# Patient Record
Sex: FEMALE | Race: ASIAN | NOT HISPANIC OR LATINO | Employment: OTHER | URBAN - METROPOLITAN AREA
[De-identification: names, ages, dates, MRNs, and addresses within clinical notes are randomized per-mention and may not be internally consistent; named-entity substitution may affect disease eponyms.]

---

## 2017-03-04 ENCOUNTER — APPOINTMENT (OUTPATIENT)
Dept: RADIOLOGY | Facility: MEDICAL CENTER | Age: 72
DRG: 480 | End: 2017-03-04
Attending: ORTHOPAEDIC SURGERY
Payer: COMMERCIAL

## 2017-03-04 ENCOUNTER — APPOINTMENT (OUTPATIENT)
Dept: RADIOLOGY | Facility: MEDICAL CENTER | Age: 72
DRG: 480 | End: 2017-03-04
Attending: EMERGENCY MEDICINE
Payer: COMMERCIAL

## 2017-03-04 ENCOUNTER — RESOLUTE PROFESSIONAL BILLING HOSPITAL PROF FEE (OUTPATIENT)
Dept: HOSPITALIST | Facility: MEDICAL CENTER | Age: 72
End: 2017-03-04

## 2017-03-04 ENCOUNTER — HOSPITAL ENCOUNTER (INPATIENT)
Facility: MEDICAL CENTER | Age: 72
LOS: 11 days | DRG: 480 | End: 2017-03-15
Attending: EMERGENCY MEDICINE | Admitting: INTERNAL MEDICINE
Payer: COMMERCIAL
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
## 2017-03-04 DIAGNOSIS — S72.001A CLOSED FRACTURE OF NECK OF RIGHT FEMUR, INITIAL ENCOUNTER (HCC): ICD-10-CM

## 2017-03-04 PROBLEM — E11.9 DM TYPE 2 (DIABETES MELLITUS, TYPE 2) (HCC): Status: ACTIVE | Noted: 2017-03-04

## 2017-03-04 PROBLEM — S72.009A FEMORAL NECK FRACTURE (HCC): Status: ACTIVE | Noted: 2017-03-04

## 2017-03-04 LAB
ALBUMIN SERPL BCP-MCNC: 4 G/DL (ref 3.2–4.9)
ALBUMIN/GLOB SERPL: 1.1 G/DL
ALP SERPL-CCNC: 41 U/L (ref 30–99)
ALT SERPL-CCNC: 17 U/L (ref 2–50)
ANION GAP SERPL CALC-SCNC: 11 MMOL/L (ref 0–11.9)
APTT PPP: 24.3 SEC (ref 24.7–36)
AST SERPL-CCNC: 33 U/L (ref 12–45)
BASOPHILS # BLD AUTO: 0.4 % (ref 0–1.8)
BASOPHILS # BLD: 0.04 K/UL (ref 0–0.12)
BILIRUB SERPL-MCNC: 0.4 MG/DL (ref 0.1–1.5)
BUN SERPL-MCNC: 34 MG/DL (ref 8–22)
CALCIUM SERPL-MCNC: 9.6 MG/DL (ref 8.5–10.5)
CHLORIDE SERPL-SCNC: 104 MMOL/L (ref 96–112)
CO2 SERPL-SCNC: 22 MMOL/L (ref 20–33)
CREAT SERPL-MCNC: 0.96 MG/DL (ref 0.5–1.4)
EOSINOPHIL # BLD AUTO: 0.19 K/UL (ref 0–0.51)
EOSINOPHIL NFR BLD: 2.1 % (ref 0–6.9)
ERYTHROCYTE [DISTWIDTH] IN BLOOD BY AUTOMATED COUNT: 36.1 FL (ref 35.9–50)
FERRITIN SERPL-MCNC: 194.2 NG/ML (ref 10–291)
GFR SERPL CREATININE-BSD FRML MDRD: 57 ML/MIN/1.73 M 2
GLOBULIN SER CALC-MCNC: 3.7 G/DL (ref 1.9–3.5)
GLUCOSE BLD-MCNC: 175 MG/DL (ref 65–99)
GLUCOSE SERPL-MCNC: 188 MG/DL (ref 65–99)
HCT VFR BLD AUTO: 32.9 % (ref 37–47)
HGB BLD-MCNC: 10.5 G/DL (ref 12–16)
HGB RETIC QN AUTO: 24.2 PG/CELL (ref 29–35)
IMM GRANULOCYTES # BLD AUTO: 0.04 K/UL (ref 0–0.11)
IMM GRANULOCYTES NFR BLD AUTO: 0.4 % (ref 0–0.9)
IMM RETICS NFR: 25.2 % (ref 9.3–17.4)
INR PPP: 0.93 (ref 0.87–1.13)
IRON SATN MFR SERPL: 7 % (ref 15–55)
IRON SERPL-MCNC: 26 UG/DL (ref 40–170)
LYMPHOCYTES # BLD AUTO: 1.19 K/UL (ref 1–4.8)
LYMPHOCYTES NFR BLD: 13.2 % (ref 22–41)
MCH RBC QN AUTO: 20.8 PG (ref 27–33)
MCHC RBC AUTO-ENTMCNC: 31.9 G/DL (ref 33.6–35)
MCV RBC AUTO: 65.3 FL (ref 81.4–97.8)
MONOCYTES # BLD AUTO: 0.62 K/UL (ref 0–0.85)
MONOCYTES NFR BLD AUTO: 6.9 % (ref 0–13.4)
NEUTROPHILS # BLD AUTO: 6.91 K/UL (ref 2–7.15)
NEUTROPHILS NFR BLD: 77 % (ref 44–72)
NRBC # BLD AUTO: 0 K/UL
NRBC BLD AUTO-RTO: 0 /100 WBC
PLATELET # BLD AUTO: 290 K/UL (ref 164–446)
PMV BLD AUTO: 8.2 FL (ref 9–12.9)
POTASSIUM SERPL-SCNC: 4.4 MMOL/L (ref 3.6–5.5)
PROT SERPL-MCNC: 7.7 G/DL (ref 6–8.2)
PROTHROMBIN TIME: 12.7 SEC (ref 12–14.6)
RBC # BLD AUTO: 5.04 M/UL (ref 4.2–5.4)
RETICS # AUTO: 0.08 M/UL (ref 0.04–0.06)
RETICS/RBC NFR: 1.6 % (ref 0.8–2.1)
SODIUM SERPL-SCNC: 137 MMOL/L (ref 135–145)
TIBC SERPL-MCNC: 374 UG/DL (ref 250–450)
TROPONIN I SERPL-MCNC: <0.01 NG/ML (ref 0–0.04)
WBC # BLD AUTO: 9 K/UL (ref 4.8–10.8)

## 2017-03-04 PROCEDURE — 700111 HCHG RX REV CODE 636 W/ 250 OVERRIDE (IP): Performed by: ORTHOPAEDIC SURGERY

## 2017-03-04 PROCEDURE — 500122 HCHG BOVIE, BLADE: Performed by: ORTHOPAEDIC SURGERY

## 2017-03-04 PROCEDURE — 500891 HCHG PACK, ORTHO MAJOR: Performed by: ORTHOPAEDIC SURGERY

## 2017-03-04 PROCEDURE — 99291 CRITICAL CARE FIRST HOUR: CPT

## 2017-03-04 PROCEDURE — 502000 HCHG MISC OR IMPLANTS RC 0278: Performed by: ORTHOPAEDIC SURGERY

## 2017-03-04 PROCEDURE — 83550 IRON BINDING TEST: CPT

## 2017-03-04 PROCEDURE — 160029 HCHG SURGERY MINUTES - 1ST 30 MINS LEVEL 4: Performed by: ORTHOPAEDIC SURGERY

## 2017-03-04 PROCEDURE — 110382 HCHG SHELL REV 271: Performed by: ORTHOPAEDIC SURGERY

## 2017-03-04 PROCEDURE — 71010 DX-CHEST-PORTABLE (1 VIEW): CPT

## 2017-03-04 PROCEDURE — 700102 HCHG RX REV CODE 250 W/ 637 OVERRIDE(OP): Performed by: INTERNAL MEDICINE

## 2017-03-04 PROCEDURE — 700102 HCHG RX REV CODE 250 W/ 637 OVERRIDE(OP): Performed by: ORTHOPAEDIC SURGERY

## 2017-03-04 PROCEDURE — 73502 X-RAY EXAM HIP UNI 2-3 VIEWS: CPT | Mod: RT

## 2017-03-04 PROCEDURE — 700111 HCHG RX REV CODE 636 W/ 250 OVERRIDE (IP): Performed by: EMERGENCY MEDICINE

## 2017-03-04 PROCEDURE — 160048 HCHG OR STATISTICAL LEVEL 1-5: Performed by: ORTHOPAEDIC SURGERY

## 2017-03-04 PROCEDURE — 110371 HCHG SHELL REV 272: Performed by: ORTHOPAEDIC SURGERY

## 2017-03-04 PROCEDURE — 501838 HCHG SUTURE GENERAL: Performed by: ORTHOPAEDIC SURGERY

## 2017-03-04 PROCEDURE — 85025 COMPLETE CBC W/AUTO DIFF WBC: CPT

## 2017-03-04 PROCEDURE — 96375 TX/PRO/DX INJ NEW DRUG ADDON: CPT

## 2017-03-04 PROCEDURE — 700105 HCHG RX REV CODE 258: Performed by: EMERGENCY MEDICINE

## 2017-03-04 PROCEDURE — 500424 HCHG DRESSING, AIRSTRIP: Performed by: ORTHOPAEDIC SURGERY

## 2017-03-04 PROCEDURE — 160036 HCHG PACU - EA ADDL 30 MINS PHASE I: Performed by: ORTHOPAEDIC SURGERY

## 2017-03-04 PROCEDURE — 99407 BEHAV CHNG SMOKING > 10 MIN: CPT | Performed by: INTERNAL MEDICINE

## 2017-03-04 PROCEDURE — 700105 HCHG RX REV CODE 258: Performed by: INTERNAL MEDICINE

## 2017-03-04 PROCEDURE — 84484 ASSAY OF TROPONIN QUANT: CPT

## 2017-03-04 PROCEDURE — 83540 ASSAY OF IRON: CPT

## 2017-03-04 PROCEDURE — 700111 HCHG RX REV CODE 636 W/ 250 OVERRIDE (IP)

## 2017-03-04 PROCEDURE — 96374 THER/PROPH/DIAG INJ IV PUSH: CPT

## 2017-03-04 PROCEDURE — A4606 OXYGEN PROBE USED W OXIMETER: HCPCS | Performed by: ORTHOPAEDIC SURGERY

## 2017-03-04 PROCEDURE — 80053 COMPREHEN METABOLIC PANEL: CPT

## 2017-03-04 PROCEDURE — 93005 ELECTROCARDIOGRAM TRACING: CPT | Performed by: EMERGENCY MEDICINE

## 2017-03-04 PROCEDURE — 160002 HCHG RECOVERY MINUTES (STAT): Performed by: ORTHOPAEDIC SURGERY

## 2017-03-04 PROCEDURE — A9270 NON-COVERED ITEM OR SERVICE: HCPCS | Performed by: ORTHOPAEDIC SURGERY

## 2017-03-04 PROCEDURE — 85046 RETICYTE/HGB CONCENTRATE: CPT

## 2017-03-04 PROCEDURE — 770006 HCHG ROOM/CARE - MED/SURG/GYN SEMI*

## 2017-03-04 PROCEDURE — 99223 1ST HOSP IP/OBS HIGH 75: CPT | Performed by: INTERNAL MEDICINE

## 2017-03-04 PROCEDURE — 0QS606Z REPOSITION RIGHT UPPER FEMUR WITH INTRAMEDULLARY INTERNAL FIXATION DEVICE, OPEN APPROACH: ICD-10-PCS | Performed by: ORTHOPAEDIC SURGERY

## 2017-03-04 PROCEDURE — 96361 HYDRATE IV INFUSION ADD-ON: CPT

## 2017-03-04 PROCEDURE — 82728 ASSAY OF FERRITIN: CPT

## 2017-03-04 PROCEDURE — 85610 PROTHROMBIN TIME: CPT

## 2017-03-04 PROCEDURE — 160035 HCHG PACU - 1ST 60 MINS PHASE I: Performed by: ORTHOPAEDIC SURGERY

## 2017-03-04 PROCEDURE — 73552 X-RAY EXAM OF FEMUR 2/>: CPT | Mod: RT

## 2017-03-04 PROCEDURE — 94760 N-INVAS EAR/PLS OXIMETRY 1: CPT

## 2017-03-04 PROCEDURE — 85730 THROMBOPLASTIN TIME PARTIAL: CPT

## 2017-03-04 PROCEDURE — 82962 GLUCOSE BLOOD TEST: CPT

## 2017-03-04 PROCEDURE — 502240 HCHG MISC OR SUPPLY RC 0272: Performed by: ORTHOPAEDIC SURGERY

## 2017-03-04 PROCEDURE — 160041 HCHG SURGERY MINUTES - EA ADDL 1 MIN LEVEL 4: Performed by: ORTHOPAEDIC SURGERY

## 2017-03-04 PROCEDURE — 160009 HCHG ANES TIME/MIN: Performed by: ORTHOPAEDIC SURGERY

## 2017-03-04 PROCEDURE — 36415 COLL VENOUS BLD VENIPUNCTURE: CPT

## 2017-03-04 DEVICE — IMPLANTABLE DEVICE: Type: IMPLANTABLE DEVICE | Status: FUNCTIONAL

## 2017-03-04 RX ORDER — OXYCODONE HYDROCHLORIDE 10 MG/1
10 TABLET ORAL EVERY 4 HOURS PRN
Status: DISCONTINUED | OUTPATIENT
Start: 2017-03-04 | End: 2017-03-15 | Stop reason: HOSPADM

## 2017-03-04 RX ORDER — AMOXICILLIN 250 MG
2 CAPSULE ORAL 2 TIMES DAILY
Status: DISCONTINUED | OUTPATIENT
Start: 2017-03-04 | End: 2017-03-15 | Stop reason: HOSPADM

## 2017-03-04 RX ORDER — HEPARIN SODIUM 5000 [USP'U]/ML
5000 INJECTION, SOLUTION INTRAVENOUS; SUBCUTANEOUS EVERY 8 HOURS
Status: DISCONTINUED | OUTPATIENT
Start: 2017-03-05 | End: 2017-03-15 | Stop reason: HOSPADM

## 2017-03-04 RX ORDER — BISACODYL 10 MG
10 SUPPOSITORY, RECTAL RECTAL
Status: DISCONTINUED | OUTPATIENT
Start: 2017-03-04 | End: 2017-03-15 | Stop reason: HOSPADM

## 2017-03-04 RX ORDER — SODIUM CHLORIDE 9 MG/ML
INJECTION, SOLUTION INTRAVENOUS CONTINUOUS
Status: DISPENSED | OUTPATIENT
Start: 2017-03-04 | End: 2017-03-05

## 2017-03-04 RX ORDER — IBUPROFEN 800 MG/1
800 TABLET ORAL EVERY 8 HOURS PRN
Status: ON HOLD | COMMUNITY
End: 2017-03-15

## 2017-03-04 RX ORDER — OXYCODONE HYDROCHLORIDE 5 MG/1
5 TABLET ORAL EVERY 4 HOURS PRN
Status: DISCONTINUED | OUTPATIENT
Start: 2017-03-04 | End: 2017-03-15 | Stop reason: HOSPADM

## 2017-03-04 RX ORDER — NICOTINE 21 MG/24HR
14 PATCH, TRANSDERMAL 24 HOURS TRANSDERMAL
Status: DISCONTINUED | OUTPATIENT
Start: 2017-03-04 | End: 2017-03-15 | Stop reason: HOSPADM

## 2017-03-04 RX ORDER — POLYETHYLENE GLYCOL 3350 17 G/17G
1 POWDER, FOR SOLUTION ORAL
Status: DISCONTINUED | OUTPATIENT
Start: 2017-03-04 | End: 2017-03-15 | Stop reason: HOSPADM

## 2017-03-04 RX ORDER — SODIUM CHLORIDE 9 MG/ML
1000 INJECTION, SOLUTION INTRAVENOUS ONCE
Status: COMPLETED | OUTPATIENT
Start: 2017-03-04 | End: 2017-03-04

## 2017-03-04 RX ORDER — ONDANSETRON 2 MG/ML
4 INJECTION INTRAMUSCULAR; INTRAVENOUS ONCE
Status: COMPLETED | OUTPATIENT
Start: 2017-03-04 | End: 2017-03-04

## 2017-03-04 RX ORDER — MORPHINE SULFATE 4 MG/ML
4 INJECTION, SOLUTION INTRAMUSCULAR; INTRAVENOUS ONCE
Status: COMPLETED | OUTPATIENT
Start: 2017-03-04 | End: 2017-03-04

## 2017-03-04 RX ADMIN — ONDANSETRON 4 MG: 2 INJECTION, SOLUTION INTRAMUSCULAR; INTRAVENOUS at 10:06

## 2017-03-04 RX ADMIN — CEFAZOLIN SODIUM 1000 MG: 1 INJECTION, SOLUTION INTRAVENOUS at 22:20

## 2017-03-04 RX ADMIN — MORPHINE SULFATE 4 MG: 4 INJECTION INTRAVENOUS at 10:06

## 2017-03-04 RX ADMIN — OXYCODONE HYDROCHLORIDE 5 MG: 5 TABLET ORAL at 19:58

## 2017-03-04 RX ADMIN — SODIUM CHLORIDE: 9 INJECTION, SOLUTION INTRAVENOUS at 22:24

## 2017-03-04 RX ADMIN — SODIUM CHLORIDE 1000 ML: 9 INJECTION, SOLUTION INTRAVENOUS at 10:06

## 2017-03-04 RX ADMIN — INSULIN LISPRO 2 UNITS: 100 INJECTION, SOLUTION INTRAVENOUS; SUBCUTANEOUS at 22:19

## 2017-03-04 ASSESSMENT — ENCOUNTER SYMPTOMS
LOSS OF CONSCIOUSNESS: 0
NAUSEA: 0
SHORTNESS OF BREATH: 0
DIZZINESS: 1
HEADACHES: 0
VOMITING: 0
NECK PAIN: 0

## 2017-03-04 ASSESSMENT — PAIN SCALES - GENERAL
PAINLEVEL_OUTOF10: 0
PAINLEVEL_OUTOF10: 10
PAINLEVEL_OUTOF10: 0
PAINLEVEL_OUTOF10: 10

## 2017-03-04 ASSESSMENT — LIFESTYLE VARIABLES
ALCOHOL_USE: NO
EVER_SMOKED: YES
EVER_SMOKED: YES

## 2017-03-04 ASSESSMENT — COPD QUESTIONNAIRES
HAVE YOU SMOKED AT LEAST 100 CIGARETTES IN YOUR ENTIRE LIFE: YES
DURING THE PAST 4 WEEKS HOW MUCH DID YOU FEEL SHORT OF BREATH: NONE/LITTLE OF THE TIME
DO YOU EVER COUGH UP ANY MUCUS OR PHLEGM?: YES, A FEW DAYS A WEEK OR MONTH
COPD SCREENING SCORE: 5

## 2017-03-04 NOTE — CONSULTS
DATE OF SERVICE:  03/04/2017    REQUESTING PHYSICIAN:  Dr. Ro Meredith, emergency department.    REASON FOR CONSULTATION:  Right intertrochanteric femur fracture.    CHIEF COMPLAINT:  Right hip pain.    HISTORY OF PRESENT ILLNESS:  The patient is a 71-year-old female.  She is here   with her daughter and son-in-law.  She is visiting from the [facility].  She   was reaching over to pick something up and says that she got dizzy and fell   on to her right hip.  Her family says that she occasionally gets dizzy when   she stands up too quickly, but has not had recurrent falls.  She landed onto   her right hip and had immediate pain and inability to bear weight.  She was   transported to [facility] in the emergency department where x-rays determined that   she had a right displaced intertrochanteric femur fracture.  She denies   having pain elsewhere other than that to her right thigh and groin area.  She   denies loss of consciousness or hitting her head.  She also denies numbness   and paresthesias in her extremities.    ALLERGIES:  No known drug allergies.    OUTPATIENT MEDICATIONS:  Include metformin, ibuprofen, and multivitamin.    PAST MEDICAL DIAGNOSIS:  Type 2 diabetes.    PAST SURGICAL HISTORY:  Surgery for an ectopic pregnancy years ago.    SOCIAL HISTORY:  The patient smokes half pack of cigarettes a day.  Does not   drink alcohol and does not use illicit drugs.  She is visiting from the   [facility] and staying with her family.    FAMILY HISTORY:  Negative for significant medical comorbidities that they are   aware of, other than diabetes.    REVIEW OF SYSTEMS:  She denies fevers, chills, nausea, vomiting, shortness of   breath or active chest pain, otherwise normal per AMA criteria other than that   already stated in the HPI.    PHYSICAL EXAMINATION:  VITAL SIGNS:  Temperature 98.8, heart rate 71, respiratory rate 16, blood   pressure 155/77, pulse oximetry 95% on room air.  GENERAL APPEARANCE:  The patient is  alert.  She is oriented.  She is in a mild   amount of distress, relatively comfortable lying supine on the gurney.  HEAD, EYES, EARS, NOSE, AND THROAT:  Normocephalic and atraumatic.  Mucous   membranes are moist.  Nonicteric sclerae.  PULMONARY:  Symmetric, unlabored breathing.  CARDIOVASCULAR:  Extremities well perfused.  No obvious elevated JVP is noted.  ABDOMEN:  Thin, nondistended.  MUSCULOSKELETAL:  Examination of the right lower extremity, her limb short and   externally rotated and her hip is flexed.  She is nontender to palpation at   the knee where there is no knee effusion.  She is able to dorsi and plantar   flex her foot and flex and extend her toes.  She has sensation diffusely   intact to light touch in the sural, saphenous, deep peroneal, superficial   peroneal, and tibial nerve distributions with palpable dorsalis pedis pulse.    The left lower extremity and bilateral upper extremities are without evidence   of traumatic deformity and grossly neurovascularly intact.    RADIOGRAPHIC DATA:  Plain x-rays of the pelvis including the right hip and   femur shows evidence of a displaced intertrochanteric femur fracture with some   comminution.    LABORATORY DATA:  White blood count is 9.0, hemoglobin 10.5, hematocrit 32.9,   platelet count 290,000.  Glucose is 188, creatinine 0.96, potassium 4.4,   sodium 137.  Her INR is 0.93.    ASSESSMENT:  A 71-year-old female with diabetes and a right displaced   intertrochanteric femur fracture from a ground level fall.  She is being   evaluated for admission to the hospitalist service.    RECOMMENDATIONS:  1.  I discussed these findings in detail with the family.  We discussed   treatment recommendations and I recommend surgical fixation with   intramedullary nailing.  We discussed the alternative being nonoperative   management, which is essentially bed rest.  I do not feel that she is a good   candidate for this given the risk for continued pain, malunion and  prolonged   bed rest potentially result in pneumonia and bed sores.  We discussed risks of   surgery including infection, nonunion, malunion, loss of fixation, iatrogenic   fracture, potential for risk of injury to neurovascular structures, potential   for blood loss requiring transfusion, DVT and pulmonary embolism and as well   as general risks of anesthesia including heart attack, stroke, and death.  Her   family expressed understanding and the patient wishes to proceed with   surgical management when possible.  2.  Pending medical optimization, I will try to make preparations to get the   patient to the operating room later this afternoon for surgical fixation of   right displaced intertrochanteric femur fracture.  3.  She should continue to be bed rest and n.p.o.       ____________________________________     MD MANUEL Bedoya / NTS    DD:  03/04/2017 11:50:18  DT:  03/04/2017 12:10:40    D#:  [id number]  Job#:  [id number]

## 2017-03-04 NOTE — IP AVS SNAPSHOT
" Home Care Instructions                                                                                                                  Name:Analia Martel  Medical Record Number:[id number]  CSN: [id number]    YOB: 1945   Age: 71 y.o.  Sex: female  HT:1.473 m (4' 10\") WT: 40.824 kg (90 lb)          Admit Date: 3/4/2017     Discharge Date:   Today's Date: 3/15/2017  Attending Doctor:  Christian Womack M.D.                  Allergies:  Review of patient's allergies indicates no known allergies.            Discharge Instructions       Discharge Instructions    Discharged to home by car with relative. Discharged via wheelchair, hospital escort: Yes.  Special equipment needed: Not Applicable and Walker    Be sure to schedule a follow-up appointment with your primary care doctor or any specialists as instructed.     Discharge Plan:   Diet Plan: Discussed  Activity Level: Discussed  Smoking Cessation Offered: Patient Refused  Confirmed Follow up Appointment: Appointment Scheduled  Confirmed Symptoms Management: Discussed  Medication Reconciliation Updated: Yes  Influenza Vaccine Indication: Not indicated: Previously immunized this influenza season and > 8 years of age  Influenza Vaccine Given - only chart on this line when given: Influenza Vaccine Given (See MAR)    I understand that a diet low in cholesterol, fat, and sodium is recommended for good health. Unless I have been given specific instructions below for another diet, I accept this instruction as my diet prescription.   Other diet: Return to Regular diet    Special Instructions:   Discharge patient Home   Diet low fat, low sugar, heart healthy with 9-13 servings of fruits and vegetables   Activities as tolerated   Follow ups with PCP in 7-10 days  Medications per medication sheet   No smoking, no alcohol, no caffeine   Wear seat belt in motorized vehicle   Take medications as perscribed   Keep appointments   If symptoms worsen call PCP, 911 or " urgent care.    · Is patient discharged on Warfarin / Coumadin?   No     · Is patient Post Blood Transfusion?  No    Depression / Suicide Risk    As you are discharged from this [facility] facility, it is important to learn how to keep safe from harming yourself.    Recognize the warning signs:  · Abrupt changes in personality, positive or negative- including increase in energy   · Giving away possessions  · Change in eating patterns- significant weight changes-  positive or negative  · Change in sleeping patterns- unable to sleep or sleeping all the time   · Unwillingness or inability to communicate  · Depression  · Unusual sadness, discouragement and loneliness  · Talk of wanting to die  · Neglect of personal appearance   · Rebelliousness- reckless behavior  · Withdrawal from people/activities they love  · Confusion- inability to concentrate     If you or a loved one observes any of these behaviors or has concerns about self-harm, here's what you can do:  · Talk about it- your feelings and reasons for harming yourself  · Remove any means that you might use to hurt yourself (examples: pills, rope, extension cords, firearm)  · Get professional help from the community (Mental Health, Substance Abuse, psychological counseling)  · Do not be alone:Call your Safe Contact- someone whom you trust who will be there for you.  · Call your local CRISIS HOTLINE 339-3502 or 059-342-2749  · Call your local Children's Mobile Crisis Response Team [place] (252) 383-9948 or www.Leapfactor  · Call the toll free National Suicide Prevention Hotlines   · National Suicide Prevention Lifeline 985-710-RXPO (1241)  · National Hope Line Network 800-SUICIDE (053-0941)        Follow-up Information     1. Follow up with Jean Carlos Ozuna M.D.. Go on 3/24/2017.    Specialty:  Orthopaedics    Why:  PLEASE ARRIVE AT 1:30PM FOR A 2:00PM APPOINTMENT.  **PLEASE BRING 250.00 DEPOSITE WITH YOU TO THIS VISIT-IF YOU NEED TO SET UP PAYMENT  ARRANGEMENTS CALL [phone] AND ASK FOR BILLING** THANK YOU    Contact information    [address] [place] [address]  [place] [id number] [phone]           Discharge Medication Instructions:    Below are the medications your physician expects you to take upon discharge:    Review all your home medications and newly ordered medications with your doctor and/or pharmacist. Follow medication instructions as directed by your doctor and/or pharmacist.    Please keep your medication list with you and share with your physician.               Medication List      START taking these medications        Instructions    ferrous gluconate 324 (38 FE) MG Tabs   Last time this was given:  324 mg on 3/15/2017  9:03 AM   Commonly known as:  FERGON    Take 1 Tab by mouth every morning with breakfast.   Dose:  324 mg       lisinopril 5 MG Tabs   Last time this was given:  5 mg on 3/15/2017  9:03 AM   Commonly known as:  PRINIVIL    Take 1 Tab by mouth every day.   Dose:  5 mg       oxybutynin 5 MG Tabs   Last time this was given:  5 mg on 3/15/2017  9:03 AM   Commonly known as:  DITROPAN    Take 1 Tab by mouth 2 Times a Day.   Dose:  5 mg       oxycodone immediate-release 5 MG Tabs   Last time this was given:  5 mg on 3/15/2017  2:32 PM   Commonly known as:  ROXICODONE    Take 1 Tab by mouth every four hours as needed.   Dose:  5 mg         CONTINUE taking these medications        Instructions    metformin 500 MG Tabs   Commonly known as:  GLUCOPHAGE    Take 1,000 mg by mouth 2 times a day, with meals.   Dose:  1000 mg       MULTI VITAMIN DAILY PO    Take 1 Cap by mouth every day. [employer]in Daily Diabetes Health Pack   Dose:  1 Cap         STOP taking these medications     ibuprofen 800 MG Tabs   Commonly known as:  MOTRIN               Instructions           Diet / Nutrition:    Follow any diet instructions given to you by your doctor or the dietician, including how much salt (sodium) you are allowed each day.    If you are overweight,  talk to your doctor about a weight reduction plan.    Activity:    Remain physically active following your doctor's instructions about exercise and activity.    Rest often.     Any time you become even a little tired or short of breath, SIT DOWN and rest.    Worsening Symptoms:    Report any of the following signs and symptoms to the doctor's office immediately:    *Pain of jaw, arm, or neck  *Chest pain not relieved by medication                               *Dizziness or loss of consciousness  *Difficulty breathing even when at rest   *More tired than usual                                       *Bleeding drainage or swelling of surgical site  *Swelling of feet, ankles, legs or stomach                 *Fever (>100ºF)  *Pink or blood tinged sputum  *Weight gain (3lbs/day or 5lbs /week)           *Shock from internal defibrillator (if applicable)  *Palpitations or irregular heartbeats                *Cool and/or numb extremities    Stroke Awareness    Common Risk Factors for Stroke include:    Age  Atrial Fibrillation  Carotid Artery Stenosis  Diabetes Mellitus  Excessive alcohol consumption  High blood pressure  Overweight   Physical inactivity  Smoking    Warning signs and symptoms of a stroke include:    *Sudden numbness or weakness of the face, arm or leg (especially on one side of the body).  *Sudden confusion, trouble speaking or understanding.  *Sudden trouble seeing in one or both eyes.  *Sudden trouble walking, dizziness, loss of balance or coordination.Sudden severe headache with no known cause.    It is very important to get treatment quickly when a stroke occurs. If you experience any of the above warning signs, call 911 immediately.                   Disclaimer         Quit Smoking / Tobacco Use:    I understand the use of any tobacco products increases my chance of suffering from future heart disease or stroke and could cause other illnesses which may shorten my life. Quitting the use of tobacco  products is the single most important thing I can do to improve my health. For further information on smoking / tobacco cessation call a Toll Free Quit Line at 1-282.857.8196 (*National Cancer [place]) or 1-531.103.4841 (American Lung Association) or you can access the web based program at www.lungusa.org.    [place] Tobacco Users Help Line:  (104) 865-3888       Toll Free: 1-275.275.5342  Quit Tobacco Program [facility] (135)429-8784    Crisis Hotline:    [place] Crisis Hotline:  6-090-GTNKZWR or 1-233.244.7586    [place] Crisis Hotline:    1-402.750.6780 or 514-790-2573    Discharge Survey:   Thank you for choosing [facility]. We hope we did everything we could to make your hospital stay a pleasant one. You may be receiving a phone survey and we would appreciate your time and participation in answering the questions. Your input is very valuable to us in our efforts to improve our service to our patients and their families.        My signature on this form indicates that:    1. I have reviewed and understand the above information.  2. My questions regarding this information have been answered to my satisfaction.  3. I have formulated a plan with my discharge nurse to obtain my prescribed medications for home.                  Disclaimer         __________________________________                     __________       ________                       Patient Signature                                                 Date                    Time

## 2017-03-04 NOTE — IP AVS SNAPSHOT
3/15/2017          Analia Martel  [address] [place] [address]    Dear Analia:    [facility] wants to ensure your discharge home is safe and you or your loved ones have had all your questions answered regarding your care after you leave the hospital.    You may receive a telephone call within two days of your discharge.  This call is to make certain you understand your discharge instructions as well as ensure we provided you with the best care possible during your stay with us.     The call will only last approximately 3-5 minutes and will be done by a nurse.    Once again, we want to ensure your discharge home is safe and that you have a clear understanding of any next steps in your care.  If you have any questions or concerns, please do not hesitate to contact us, we are here for you.  Thank you for choosing [facility] for your healthcare needs.    Sincerely,    Alo Krishna    [facility]

## 2017-03-04 NOTE — ED PROVIDER NOTES
Pt resting with eyes closed.  Family is concerned as she does not have health insurance.  PFS notified and they will send a financial person when she is able.

## 2017-03-04 NOTE — IP AVS SNAPSHOT
[employer]ol[employer] Access Code: [email or web address]  Expires: 4/10/2017 11:35 AM    Your email address is not on file at [facility].  Email Addresses are required for you to sign up for Scholastica, please contact [phone] to verify your personal information and to provide your email address prior to attempting to register for Scholastica.    Analia Martel  [address] [place] [address]    Scholastica  A secure, online tool to manage your health information     [employer]’s Scholastica® is a secure, online tool that connects you to your personalized health information from the privacy of your home -- day or night - making it very easy for you to manage your healthcare. Once the activation process is completed, you can even access your medical information using the Scholastica gracie, which is available for free in the Apple Gracie store or Google Play store.     To learn more about Scholastica, visit [email or web address]    There are two levels of access available (as shown below):   My Chart Features  [facility] Primary Care Doctor [facility]  Specialists [facility]  Urgent  Care Non-[facility] Primary Care Doctor   Email your healthcare team securely and privately 24/7 X X X    Manage appointments: schedule your next appointment; view details of past/upcoming appointments X      Request prescription refills. X      View recent personal medical records, including lab and immunizations X X X X   View health record, including health history, allergies, medications X X X X   Read reports about your outpatient visits, procedures, consult and ER notes X X X X   See your discharge summary, which is a recap of your hospital and/or ER visit that includes your diagnosis, lab results, and care plan X X  X     How to register for Scholastica:  Once your e-mail address has been verified, follow the following steps to sign up for [employer].     1. Go to  [email or web address]  2. Click on the Sign Up Now box, which takes you to the New Member Sign Up page. You  will need to provide the following information:  a. Enter your [employer] Access Code exactly as it appears at the top of this page. (You will not need to use this code after you’ve completed the sign-up process. If you do not sign up before the expiration date, you must request a new code.)   b. Enter your date of birth.   c. Enter your home email address.   d. Click Submit, and follow the next screen’s instructions.  3. Create a PhotoSolart ID. This will be your [employer] login ID and cannot be changed, so think of one that is secure and easy to remember.  4. Create a [employer] password. You can change your password at any time.  5. Enter your Password Reset Question and Answer. This can be used at a later time if you forget your password.   6. Enter your e-mail address. This allows you to receive e-mail notifications when new information is available in [employer].  7. Click Sign Up. You can now view your health information.    For assistance activating your [employer] account, call [phone]

## 2017-03-04 NOTE — IP AVS SNAPSHOT
" <p align=\"LEFT\"><IMG SRC=\"//[email or web address]\" alt=\"Image\" WIDTH=\"50%\" HEIGHT=\"200\" BORDER=\"\"></p>                   Name:Analia Martel  Medical Record Number:[id number]  CSN: [id number]    YOB: 1945   Age: 71 y.o.  Sex: female  HT:1.473 m (4' 10\") WT: 40.824 kg (90 lb)          Admit Date: 3/4/2017     Discharge Date:   Today's Date: 3/15/2017  Attending Doctor:  Christian Womack M.D.                  Allergies:  Review of patient's allergies indicates no known allergies.          Follow-up Information     1. Follow up with Jean Carlos Ozuna M.D.. Go on 3/24/2017.    Specialty:  Orthopaedics    Why:  PLEASE ARRIVE AT 1:30PM FOR A 2:00PM APPOINTMENT.  **PLEASE BRING 250.00 DEPOSITE WITH YOU TO THIS VISIT-IF YOU NEED TO SET UP PAYMENT ARRANGEMENTS CALL [phone] AND ASK FOR BILLING** THANK YOU    Contact information    [address] [place] [id number] [phone]           Medication List      Take these Medications        Instructions    ferrous gluconate 324 (38 FE) MG Tabs   Commonly known as:  FERGON    Take 1 Tab by mouth every morning with breakfast.   Dose:  324 mg       lisinopril 5 MG Tabs   Commonly known as:  PRINIVIL    Take 1 Tab by mouth every day.   Dose:  5 mg       metformin 500 MG Tabs   Commonly known as:  GLUCOPHAGE    Take 1,000 mg by mouth 2 times a day, with meals.   Dose:  1000 mg       MULTI VITAMIN DAILY PO    Take 1 Cap by mouth every day. [employer] Daily Diabetes Health Pack   Dose:  1 Cap       oxybutynin 5 MG Tabs   Commonly known as:  DITROPAN    Take 1 Tab by mouth 2 Times a Day.   Dose:  5 mg       oxycodone immediate-release 5 MG Tabs   Commonly known as:  ROXICODONE    Take 1 Tab by mouth every four hours as needed.   Dose:  5 mg         "

## 2017-03-04 NOTE — ED NOTES
Chief Complaint   Patient presents with   • T-5000 GLF     PT BIB self/family for c/o right side hip pain after a GLF/near syncopal episode yesterday after standing from squatting position. Pt reports right side hip/groin pain.   • Near Syncopal   • Hip Pain     Explained to pt triage process, made pt aware to tell this RN of any changes/concerns, pt verbalized understanding of process and instructions given. Pt to ER lobby.

## 2017-03-04 NOTE — ED PROVIDER NOTES
ED Provider Note    Scribed for Ro Meredith D.O. by Coretta Mcdonald. 3/4/2017, 9:45 AM.    Primary care provider: No primary care provider on file.  Means of arrival: wheel chair   History obtained from: patient   History limited by: none     CHIEF COMPLAINT  Chief Complaint   Patient presents with   • T-5000 GLF     PT BIB self/family for c/o right side hip pain after a GLF/near syncopal episode yesterday after standing from squatting position. Pt reports right side hip/groin pain.   • Near Syncopal   • Hip Pain       HPI  Analia Martel is a 71 y.o. female who presents to the Emergency Department status post ground level fall that occurred yesterday reporting subsequent onset right hip pain. The patient was bending over when she stood quickly, felt light headed, and then fell to the ground and hit her right hip. She did not lose consciousness or have head trauma. The patient's son-in-law, carried her upstairs yesterday evening to bed.  The patient rested last night and was given Motrin, but she has been unable to ambulate so they brought the patient here. Patient is unable to straighten her leg without significant pain.  She denies chest pain, nausea, vomiting, neck pain.     REVIEW OF SYSTEMS  Review of Systems   Respiratory: Negative for shortness of breath.    Cardiovascular: Negative for chest pain.   Gastrointestinal: Negative for nausea and vomiting.   Musculoskeletal: Negative for neck pain.        Positive right hip pain   Neurological: Positive for dizziness (light headedness). Negative for loss of consciousness and headaches.     As above, all other systems negative.  C    PAST MEDICAL HISTORY   has a past medical history of Diabetes (CMS-HCC).    SURGICAL HISTORY   has past surgical history that includes gyn surgery.    SOCIAL HISTORY  Social History   Substance Use Topics   • Smoking status: Current Every Day Smoker -- 0.50 packs/day   • Smokeless tobacco: None   • Alcohol Use: No     "  History   Drug Use No   accompanied by daughter and son in law   Does not reside in [place]    FAMILY HISTORY  History reviewed. No pertinent family history.    CURRENT MEDICATIONS  Home Medications     Reviewed by Patrica Vivas R.N. (Registered Nurse) on 03/04/17 at 0933  Med List Status: Complete    Medication Last Dose Status    ibuprofen (MOTRIN) 800 MG Tab 3/4/2017 Active    metformin (GLUCOPHAGE) 500 MG Tab 3/4/2017 Active    multivitamin (THERAGRAN) Tab 3/4/2017 Active                ALLERGIES  No Known Allergies    PHYSICAL EXAM  VITAL SIGNS: /66 mmHg  Pulse 66  Temp(Src) 37.1 °C (98.8 °F)  Resp 16  Ht 1.473 m (4' 10\")  Wt 40.824 kg (90 lb)  BMI 18.82 kg/m2  SpO2 99%    Vitals reviewed.  Constitutional: Patient is oriented to person, place, and time. Appears well-developed and well-nourished. Moderate distress.    Head: Normocephalic and atraumatic.   Ears: Normal external ears bilaterally.   Mouth/Throat: Oropharynx is clear and moist  Eyes: Conjunctivae are normal. Pupils are equal, round, and reactive to light.   Neck: Normal range of motion. Neck supple. No C spine tenderness.   Cardiovascular: Normal rate, regular rhythm and normal heart sounds. Normal peripheral pulses.  Pulmonary/Chest: Effort normal and breath sounds normal. No respiratory distress, no wheezes, rhonchi, or rales. No chest wall tenderness.  Abdominal: Soft. Bowel sounds are normal. There is no tenderness, rebound or guarding, or peritoneal signs  Musculoskeletal: No edema. Sits with leg externally rotated and partially flexed at the knee for comfort. Diffuse right hip tenderness, no open wounds   Neurological: No focal deficits.   Skin: Skin is warm and dry. No erythema. No pallor.   Psychiatric: Patient has a normal mood and affect.       RADIOLOGY  DX-FEMUR-2+ RIGHT   Final Result      Right femoral neck fracture.      DX-HIP-COMPLETE - UNILATERAL 2+ RIGHT   Final Result      Right intertrochanteric femoral neck " fracture.      DX-CHEST-PORTABLE (1 VIEW)   Final Result      No acute cardiac or pulmonary abnormalities are identified.      ECHOCARDIOGRAM-COMP W/ CONT    (Results Pending)   CAROTID DUPLEX    (Results Pending)     The radiologist's interpretation of all radiological studies have been reviewed by me.    EKG Interpretation    Interpreted by me    Rhythm: normal sinus   Rate: normal at 69  Axis: normal  Ectopy: none  Conduction: normal  ST Segments: no acute change  T Waves: inversion in ANT-LAT leads   Q Waves: none    Clinical Impression: T wave inversions otherwise normal EKG. No old EKG for comparison    Labs:   Results for orders placed or performed during the hospital encounter of 03/04/17   CBC WITH DIFFERENTIAL   Result Value Ref Range    WBC 9.0 4.8 - 10.8 K/uL    RBC 5.04 4.20 - 5.40 M/uL    Hemoglobin 10.5 (L) 12.0 - 16.0 g/dL    Hematocrit 32.9 (L) 37.0 - 47.0 %    MCV 65.3 (L) 81.4 - 97.8 fL    MCH 20.8 (L) 27.0 - 33.0 pg    MCHC 31.9 (L) 33.6 - 35.0 g/dL    RDW 36.1 35.9 - 50.0 fL    Platelet Count 290 164 - 446 K/uL    MPV 8.2 (L) 9.0 - 12.9 fL    Neutrophils-Polys 77.00 (H) 44.00 - 72.00 %    Lymphocytes 13.20 (L) 22.00 - 41.00 %    Monocytes 6.90 0.00 - 13.40 %    Eosinophils 2.10 0.00 - 6.90 %    Basophils 0.40 0.00 - 1.80 %    Immature Granulocytes 0.40 0.00 - 0.90 %    Nucleated RBC 0.00 /100 WBC    Neutrophils (Absolute) 6.91 2.00 - 7.15 K/uL    Lymphs (Absolute) 1.19 1.00 - 4.80 K/uL    Monos (Absolute) 0.62 0.00 - 0.85 K/uL    Eos (Absolute) 0.19 0.00 - 0.51 K/uL    Baso (Absolute) 0.04 0.00 - 0.12 K/uL    Immature Granulocytes (abs) 0.04 0.00 - 0.11 K/uL    NRBC (Absolute) 0.00 K/uL   COMP METABOLIC PANEL   Result Value Ref Range    Sodium 137 135 - 145 mmol/L    Potassium 4.4 3.6 - 5.5 mmol/L    Chloride 104 96 - 112 mmol/L    Co2 22 20 - 33 mmol/L    Anion Gap 11.0 0.0 - 11.9    Glucose 188 (H) 65 - 99 mg/dL    Bun 34 (H) 8 - 22 mg/dL    Creatinine 0.96 0.50 - 1.40 mg/dL    Calcium 9.6 8.5  - 10.5 mg/dL    AST(SGOT) 33 12 - 45 U/L    ALT(SGPT) 17 2 - 50 U/L    Alkaline Phosphatase 41 30 - 99 U/L    Total Bilirubin 0.4 0.1 - 1.5 mg/dL    Albumin 4.0 3.2 - 4.9 g/dL    Total Protein 7.7 6.0 - 8.2 g/dL    Globulin 3.7 (H) 1.9 - 3.5 g/dL    A-G Ratio 1.1 g/dL   PRTOTHROMBIN TIME (INR)   Result Value Ref Range    PT 12.7 12.0 - 14.6 sec    INR 0.93 0.87 - 1.13   APTT   Result Value Ref Range    APTT 24.3 (L) 24.7 - 36.0 sec   ESTIMATED GFR   Result Value Ref Range    GFR If African American >60 >60 mL/min/1.73 m 2    GFR If Non  57 (A) >60 mL/min/1.73 m 2   EKG (ER)   Result Value Ref Range    Report       [facility] Emergency Dept.    Test Date:  2017  Pt Name:    GEOFF HAYNES              Department: ER  MRN:        [id number]                      Room:        19  Gender:     F                            Technician: [id number]  :        [id number]                   Requested By:DESTINEE BARROSO  Order #:    [id number]                    Reading MD:    Measurements  Intervals                                Axis  Rate:       69                           P:          70  NV:         168                          QRS:        53  QRSD:       70                           T:          156  QT:         456  QTc:        489    Interpretive Statements  SINUS RHYTHM  ABNORMAL T, PROBABLE ISCHEMIA, ANT-LAT LEADS  BORDERLINE PROLONGED QT INTERVAL  No previous ECG available for comparison        All labs reviewed by me.    COURSE & MEDICAL DECISION MAKING  Pertinent Labs & Imaging studies reviewed. (See chart for details)    Obtained and reviewed past medical records which show no previous visits    9:46AM - Patient seen and examined at bedside.  This does not appear to be an episode of syncope. Patient did not lose consciousness. He states when she tried to stand up quickly, she got dizzy, then came back down and tipped over to the ground. He is been unable to ambulate. She rests with  her hip externally rotated position. High suspicion for pelvic or hip fracture.   Patient will be treated with IV fluids, 4mg Morphine, 4mg Zofran. Ordered EKG, femur xray, hip xray, chest xray, CBC with differential, CMP, and other labs to evaluate her symptoms. The differential diagnoses include but are not limited to: hip fracture vs dislocation     I told the patient and her family member that we will obtain an xray to evaluate. The patient verbalizes understanding and agreement at this time to my plan of care    11:00 AM review of patient's xray shows the patient has a hip fracture, she will be admitted today.     11:02 AM I reevaluated patient at bedside and informed her and her family members of her xray results as above. Patient will need to be admitted today to have the fracture surgically repaired with orthopedic consult. The patient's son expresses concern as the patient does not have insurance at this time and is visiting from the [facility]. I advised that the patient be admitted today given her condition.     11:08 AM Paged orthopedics and hospitalist.      Discussed with Dr. Womack, hospitalist, who agrees to admit the patient to their service.      DISPOSITION:  Patient will be admitted to stable but  guarded condition.     FINAL IMPRESSION  1. Closed fracture of neck of right femur, initial encounter (CMS-[facility])    2. Fall, initial encounter          ICoretta (Vera), am scribing for, and in the presence of, Ro Meredith D.O..    Electronically signed by: Coretta Emerson), 3/4/2017    I, Ro Meredith D.O. personally performed the services described in this documentation, as scribed by Coretta Mcdonald in my presence, and it is both accurate and complete.    The note accurately reflects work and decisions made by me.  Ro Meredith  3/4/2017  12:06 PM

## 2017-03-04 NOTE — ED NOTES
Med rec complete per patient's family member.  Allergies reviewed - NKDA.  No antibiotics in last 30 days.

## 2017-03-05 LAB
ANION GAP SERPL CALC-SCNC: 4 MMOL/L (ref 0–11.9)
BUN SERPL-MCNC: 20 MG/DL (ref 8–22)
CALCIUM SERPL-MCNC: 8.2 MG/DL (ref 8.5–10.5)
CHLORIDE SERPL-SCNC: 112 MMOL/L (ref 96–112)
CO2 SERPL-SCNC: 24 MMOL/L (ref 20–33)
CREAT SERPL-MCNC: 0.91 MG/DL (ref 0.5–1.4)
ERYTHROCYTE [DISTWIDTH] IN BLOOD BY AUTOMATED COUNT: 37.7 FL (ref 35.9–50)
GFR SERPL CREATININE-BSD FRML MDRD: >60 ML/MIN/1.73 M 2
GLUCOSE BLD-MCNC: 135 MG/DL (ref 65–99)
GLUCOSE BLD-MCNC: 161 MG/DL (ref 65–99)
GLUCOSE BLD-MCNC: 176 MG/DL (ref 65–99)
GLUCOSE BLD-MCNC: 208 MG/DL (ref 65–99)
GLUCOSE SERPL-MCNC: 144 MG/DL (ref 65–99)
HCT VFR BLD AUTO: 27 % (ref 37–47)
HGB BLD-MCNC: 8.2 G/DL (ref 12–16)
LV EJECT FRACT  99904: 75
LV EJECT FRACT MOD 2C 99903: 73.34
LV EJECT FRACT MOD 4C 99902: 72.29
LV EJECT FRACT MOD BP 99901: 74.08
MAGNESIUM SERPL-MCNC: 2.1 MG/DL (ref 1.5–2.5)
MCH RBC QN AUTO: 20.2 PG (ref 27–33)
MCHC RBC AUTO-ENTMCNC: 29.9 G/DL (ref 33.6–35)
MCV RBC AUTO: 67.6 FL (ref 81.4–97.8)
PLATELET # BLD AUTO: 235 K/UL (ref 164–446)
PMV BLD AUTO: 9.5 FL (ref 9–12.9)
POTASSIUM SERPL-SCNC: 4.4 MMOL/L (ref 3.6–5.5)
RBC # BLD AUTO: 4.01 M/UL (ref 4.2–5.4)
SODIUM SERPL-SCNC: 140 MMOL/L (ref 135–145)
WBC # BLD AUTO: 7.3 K/UL (ref 4.8–10.8)

## 2017-03-05 PROCEDURE — 93306 TTE W/DOPPLER COMPLETE: CPT

## 2017-03-05 PROCEDURE — 700105 HCHG RX REV CODE 258

## 2017-03-05 PROCEDURE — 700102 HCHG RX REV CODE 250 W/ 637 OVERRIDE(OP): Performed by: INTERNAL MEDICINE

## 2017-03-05 PROCEDURE — 99233 SBSQ HOSP IP/OBS HIGH 50: CPT | Performed by: HOSPITALIST

## 2017-03-05 PROCEDURE — 93880 EXTRACRANIAL BILAT STUDY: CPT

## 2017-03-05 PROCEDURE — 82962 GLUCOSE BLOOD TEST: CPT | Mod: 91

## 2017-03-05 PROCEDURE — 85027 COMPLETE CBC AUTOMATED: CPT

## 2017-03-05 PROCEDURE — 770006 HCHG ROOM/CARE - MED/SURG/GYN SEMI*

## 2017-03-05 PROCEDURE — 97161 PT EVAL LOW COMPLEX 20 MIN: CPT

## 2017-03-05 PROCEDURE — 93306 TTE W/DOPPLER COMPLETE: CPT | Mod: 26 | Performed by: INTERNAL MEDICINE

## 2017-03-05 PROCEDURE — A9270 NON-COVERED ITEM OR SERVICE: HCPCS | Performed by: INTERNAL MEDICINE

## 2017-03-05 PROCEDURE — 700102 HCHG RX REV CODE 250 W/ 637 OVERRIDE(OP): Performed by: ORTHOPAEDIC SURGERY

## 2017-03-05 PROCEDURE — 700111 HCHG RX REV CODE 636 W/ 250 OVERRIDE (IP): Performed by: ORTHOPAEDIC SURGERY

## 2017-03-05 PROCEDURE — A9270 NON-COVERED ITEM OR SERVICE: HCPCS | Performed by: ORTHOPAEDIC SURGERY

## 2017-03-05 PROCEDURE — 700111 HCHG RX REV CODE 636 W/ 250 OVERRIDE (IP): Performed by: HOSPITALIST

## 2017-03-05 PROCEDURE — G8979 MOBILITY GOAL STATUS: HCPCS | Mod: CJ

## 2017-03-05 PROCEDURE — 80048 BASIC METABOLIC PNL TOTAL CA: CPT

## 2017-03-05 PROCEDURE — 93880 EXTRACRANIAL BILAT STUDY: CPT | Mod: 26 | Performed by: SURGERY

## 2017-03-05 PROCEDURE — G8978 MOBILITY CURRENT STATUS: HCPCS | Mod: CL

## 2017-03-05 PROCEDURE — 83735 ASSAY OF MAGNESIUM: CPT

## 2017-03-05 PROCEDURE — 36415 COLL VENOUS BLD VENIPUNCTURE: CPT

## 2017-03-05 RX ORDER — ONDANSETRON 4 MG/1
4 TABLET, ORALLY DISINTEGRATING ORAL EVERY 4 HOURS PRN
Status: DISCONTINUED | OUTPATIENT
Start: 2017-03-05 | End: 2017-03-15 | Stop reason: HOSPADM

## 2017-03-05 RX ORDER — SODIUM CHLORIDE 9 MG/ML
INJECTION, SOLUTION INTRAVENOUS
Status: COMPLETED
Start: 2017-03-05 | End: 2017-03-06

## 2017-03-05 RX ORDER — SODIUM CHLORIDE 9 MG/ML
INJECTION, SOLUTION INTRAVENOUS
Status: COMPLETED
Start: 2017-03-05 | End: 2017-03-05

## 2017-03-05 RX ADMIN — HEPARIN SODIUM 5000 UNITS: 5000 INJECTION, SOLUTION INTRAVENOUS; SUBCUTANEOUS at 05:46

## 2017-03-05 RX ADMIN — OXYCODONE HYDROCHLORIDE 5 MG: 5 TABLET ORAL at 05:45

## 2017-03-05 RX ADMIN — CEFAZOLIN SODIUM 1000 MG: 1 INJECTION, SOLUTION INTRAVENOUS at 05:45

## 2017-03-05 RX ADMIN — INSULIN LISPRO 1 UNITS: 100 INJECTION, SOLUTION INTRAVENOUS; SUBCUTANEOUS at 20:11

## 2017-03-05 RX ADMIN — SODIUM CHLORIDE: 9 INJECTION, SOLUTION INTRAVENOUS at 10:45

## 2017-03-05 RX ADMIN — HEPARIN SODIUM 5000 UNITS: 5000 INJECTION, SOLUTION INTRAVENOUS; SUBCUTANEOUS at 22:35

## 2017-03-05 RX ADMIN — OXYCODONE HYDROCHLORIDE 5 MG: 5 TABLET ORAL at 00:38

## 2017-03-05 RX ADMIN — HEPARIN SODIUM 5000 UNITS: 5000 INJECTION, SOLUTION INTRAVENOUS; SUBCUTANEOUS at 13:09

## 2017-03-05 RX ADMIN — ONDANSETRON 4 MG: 4 TABLET, ORALLY DISINTEGRATING ORAL at 17:08

## 2017-03-05 RX ADMIN — OXYCODONE HYDROCHLORIDE 5 MG: 5 TABLET ORAL at 17:21

## 2017-03-05 RX ADMIN — INSULIN LISPRO 1 UNITS: 100 INJECTION, SOLUTION INTRAVENOUS; SUBCUTANEOUS at 13:09

## 2017-03-05 RX ADMIN — Medication 2 TABLET: at 20:11

## 2017-03-05 RX ADMIN — INSULIN LISPRO 2 UNITS: 100 INJECTION, SOLUTION INTRAVENOUS; SUBCUTANEOUS at 17:16

## 2017-03-05 RX ADMIN — NICOTINE 14 MG: 14 PATCH TRANSDERMAL at 05:45

## 2017-03-05 RX ADMIN — Medication 2 TABLET: at 09:00

## 2017-03-05 RX ADMIN — OXYCODONE HYDROCHLORIDE 10 MG: 10 TABLET ORAL at 22:39

## 2017-03-05 ASSESSMENT — GAIT ASSESSMENTS: GAIT LEVEL OF ASSIST: UNABLE TO PARTICIPATE

## 2017-03-05 ASSESSMENT — ENCOUNTER SYMPTOMS
CHILLS: 0
NAUSEA: 0
SORE THROAT: 0
HEADACHES: 0
FOCAL WEAKNESS: 0
COUGH: 0
WHEEZING: 0
DEPRESSION: 0
DIZZINESS: 0
PHOTOPHOBIA: 0
FEVER: 0
TINGLING: 0
DIARRHEA: 0
MYALGIAS: 0
ABDOMINAL PAIN: 0
PALPITATIONS: 0
SHORTNESS OF BREATH: 0
VOMITING: 0

## 2017-03-05 ASSESSMENT — PAIN SCALES - GENERAL
PAINLEVEL_OUTOF10: 5

## 2017-03-05 NOTE — PROGRESS NOTES
71yoF with right IT femur fx s/p IMN 3/4.    S: having pain postop still be apparently not asking for pain medication, likely language barrier.  Son-in-law at bedside now this am as well as Dr. Andrea.     O:  Filed Vitals:    03/04/17 2040 03/05/17 0000 03/05/17 0400 03/05/17 0800   BP: 123/64 135/76 114/57 145/63   Pulse: 69 68 71 72   Temp: 36.6 °C (97.8 °F) 37.1 °C (98.8 °F) 37.6 °C (99.6 °F) 37.1 °C (98.7 °F)   Resp: 15 15 16 18   Height:       Weight:       SpO2: 100% 99% 92% 99%     Exam:  General-mild distress due to pain, following commands, alert  RLE-dressing c/d/i, +EHL/FHL/TA/GS motor, SILT in foot and palp dp pulse    Hct: 27    A: 71yoF with right IT femur fx s/p IMN 3/4.  Expected postop pain.    PLAN:  --WBAT RLE  --PT/OT for mobilization ASAP  --heparin and continue SCDs  --pain control  --fu am Hct, consider transfusion if develops symptomatic anemia

## 2017-03-05 NOTE — OR SURGEON
Immediate Post-Operative Note      PreOp Diagnosis: Right displaced IT femur fx    PostOp Diagnosis: same    Procedure(s):  HIP NAILING INTRAMEDULLARY - Wound Class: Clean    Surgeon(s):  Jean Carlos Ozuna M.D.    Anesthesiologist/Type of Anesthesia:  Anesthesiologist: Art Goss M.D./General    Surgical Staff:  Circulator: Fuentes Chou R.N.  Scrub Person: Juan Francisco Soriano  Radiology Technician: Alfonso Cuellar    Specimen: none    Estimated Blood Loss: 150cc    Findings: see dictation    Complications: none known    PLAN:  --readmit medicine postop  --ancef x 2 doses postop  --WBAT RLE  --PT/OT for mobilization ASAP  --start heparin tomorrow and continue SCDs        3/4/2017 4:31 PM Jean Carlos Ozuna

## 2017-03-05 NOTE — PROGRESS NOTES
Pt arrived to floor from PACU w/out event. Pt is sleeping calmly, awakens on command. Family at bedside. RN transfer of care to NOS RN - report given. (1845). Post op VS started

## 2017-03-05 NOTE — OR NURSING
Report called to Ro VITAL. Plan of care discussed, daughter remains at bedside, updated on plan. Patient continues to deny pain. Placed on transport.

## 2017-03-05 NOTE — OR NURSING
"Patient off unit with transport, daughter at bedside. Patient continues to rest with eyes closed, even and unlabored respirations noted. No acute s/s of distress, patient's daughter states \"mom finally looks comfortable, she hasn't been able to rest since she fell.\"   "

## 2017-03-05 NOTE — PROGRESS NOTES
71yoF with right IT femur fx s/p IMN today.    S: having pain postop but just used bedpan.  Family at bedside.      O:  Filed Vitals:    03/04/17 1730 03/04/17 1745 03/04/17 1800 03/04/17 1839   BP:    132/66   Pulse: 74 73 72 66   Temp:    36.8 °C (98.3 °F)   Resp: 11 20 15 17   Height:       Weight:       SpO2: 100% 100% 100% 100%     Exam:  General-mild distress due to pain, following commands, alert  RLE-dressing c/d/i, +EHL/FHL/TA/GS motor, SILT in foot and palp dp pulse    A: 71yoF with right IT femur fx s/p IMN 3/4.  Expected postop pain.    PLAN:  --ancef x 2 doses postop  --WBAT RLE  --PT/OT for mobilization ASAP  --start heparin tomorrow and continue SCDs  --pain control  --fu am Hct

## 2017-03-05 NOTE — OP REPORT
DATE OF SERVICE:  03/04/2017    PREOPERATIVE DIAGNOSIS:  Right intertrochanteric femur fracture, displaced.    POSTOPERATIVE DIAGNOSIS:  Right intertrochanteric femur fracture, displaced.    PROCEDURE PERFORMED:  Open treatment with intramedullary nailing, right   displaced intertrochanteric femur fracture.    SURGEON:  Jean Carlos Ozuna MD    ANESTHESIOLOGIST:  Art Goss MD    ANESTHESIA:  General and regional.    ESTIMATED BLOOD LOSS:  150 mL.    IMPLANTS:  Synthes 10 mm x 170 mm, 125 degree TFNA with a 75 mm helical blade   and a 28 mm distal interlocking screw.    INDICATION FOR PROCEDURE:  Patient is a 71-year-old female.  She sustained a   ground level fall and a right displaced intertrochanteric femur fracture.  She   was admitted to the medical service and felt to be medically optimized for   surgical management.  I discussed with her and her family risks, benefits, and   alternatives to surgical management.  She elected to proceed with surgery.    DESCRIPTION OF PROCEDURE:  Patient was met in the preoperative holding area   and her surgical site was signed and consent was confirmed to be accurate.    She was taken back to the operating room and general anesthesia was induced.    Ancef was administered.  She was positioned in the fracture table.  Traction,   internal rotation, and adduction were applied to the right lower extremity.    The left lower extremity was extended and suspended in traction boot.    Fluoroscopic imaging confirmed acceptable alignment of the fracture with   indirect reduction techniques.  The right thigh was then prepped and draped in   the usual sterile fashion.  A formal time-out was performed to confirm   patient's correct name, correct surgical site, correct procedure, and correct   laterality.  A percutaneous starting point was made with guide pin in the tip   of the greater trochanter was advanced into the proximal femur and confirmed   acceptable position on AP and  lateral fluoroscopic imaging.  I then made an   incision over the guide pin and reamed with a femoral entry reamer.  Given   that she had a relatively small bone and short stature, I did insert a   ball-tipped guide tam and reamed past the isthmus up to an 11 mm reamer as the   smallest nail with 10 mm that was available.  I inserted a 170 mm x 10 mm x   125 degree TFNA as 130-degree 10 mm nail was not available into the femur and   to the appropriate level.  Given the size of her bone, the nail was relatively   prominent, so I advanced it little more to reduce prominence of the greater   trochanter, putting the guide pin for the helical blade relatively low in the   femoral head on AP view, but it was centered on the lateral view.  I felt this   was acceptable.  I then prepared for an inserted a 75 mm helical blade, which   was the shortest.  She actually measured at 70 mm, so I am using the guide.    We planned to have the helical blade sit somewhat laterally to prevent   intra-articular penetration.  I then inserted the helical blade and then set   the set screw for dynamic compression and achieved compression through the jig  at the fracture site and then placed one lateral to medial distal interlocking   screw using the aiming arm.  All insertion instrumentation was removed.  Final   fluoroscopic imaging confirmed overall acceptable alignment of the fracture   and acceptable position of the implants.  The wounds were thoroughly   irrigated.  Layered closures were performed of the deep fascial layers with 0   Vicryl, subQ layers with 0 Vicryl, 2-0 Vicryl, and skin edges with staples.    The wound was thoroughly cleansed and dried and a sterile dressing was   applied.  Dr. Goss was then performed fascia iliac regional nerve block.    The patient was then taken out of traction, awoken from anesthesia,   [place]red [place] the [place], and taken to the postanesthesia care unit in stable   condition.    PLAN:  1.   Patient will be readmitted to the medical service postop.  2.  She will need Ancef for 2 doses postop for infection prophylaxis.  3.  She should be weightbearing as tolerated to the right lower extremity.  4.  She should work with physical and occupational therapy as soon as possible   for mobilization.  5.  She should be started on heparin tomorrow and continue SCDs for DVT   prophylaxis.       ____________________________________     MD MANUEL Bedoya / WAN    DD:  03/04/2017 16:38:40  DT:  03/04/2017 17:12:37    D#:  [id number]  Job#:  [id number]

## 2017-03-05 NOTE — PROGRESS NOTES
Received report from off going nurse. Assumed care. Patient is resting in bed; call light in reach; bed at lowest position. No c/o at this time. Will continue to monitor.  Family at bedside

## 2017-03-05 NOTE — H&P
CHIEF COMPLAINT:  Fell down at home yesterday, now with right hip pain.    HISTORY OF PRESENT ILLNESS:  The patient is a 71-year-old [place] woman   living with her family here.  All the history is obtained, but the patient's   family needs translation.  Apparently, she was in her garden yesterday and got   up too fast and then dizzy, fell over, and then fell on her right side.  She   had immediate pain and could not move.  The son has been caring her around the   house.  Family brought her in today where she was found to have a right   femoral neck fracture.  Apparently, the patient has a history of getting dizzy   and nearly falling down when she stands up too quickly.  They told the   patient constantly to try to ease back on this, but she does not want to.  She   took Tylenol at home for the pain, which did not help.  She denies any   nausea, vomiting, fevers, or chills.  No diarrhea.  No dysuria, frequency,   hematuria.  She is on no blood thinners.  She says the pain is quite severe at   10/10.  It is most along the right lateral hip.  She denies any numbness of   those feet.    REVIEW OF SYSTEMS:  All other systems reviewed and negative.    In the ED, Dr. Jean Carlos Ozuna of Orthopedic Surgery was called.    PAST MEDICAL HISTORY:  Hypertension.    PAST SURGICAL HISTORY:  Ectopic pregnancy.    SOCIAL HISTORY:  Lives with her son, originally from the [facility].  Has a   20 plus pack-year history of smoking, smokes 5 cigarettes per day.  I spent   over 10 minutes counseling the patient on the importance of tobacco cessation   and she is not interested at this time.  Bill code 57802.  No drug use.    ALLERGIES:  None.    MEDICATIONS PRIOR TO ADMISSION:  1. Ibuprofen 800 mg every 8 hours as needed for pain.  2. Metformin 1000 mg b.i.d. with meals.  3. Multivitamin tablet daily.    PHYSICAL EXAMINATION:  VITAL SIGNS:  Temperature is 36.8, heart rate 60, respiratory rate 17, oxygen   saturation 100% on 2 L nasal  cannula, blood pressure 132/66.  GENERAL:  No acute distress.  Speaking in full sentences.  She speaks only   Tagalog.  HEENT:  Normocephalic, atraumatic.  Dry mucous membranes.  NECK:  Flat JVD.  CARDIOVASCULAR:  Regular rate and rhythm.  No murmurs, rubs, or gallops.    Nondisplaced PMI.  LUNGS:  Clear to auscultation bilaterally.  No use of accessory muscles.  ABDOMEN:  Soft, nontender, and nondistended.  Normoactive bowel without   hepatomegaly.  EXTREMITIES:  Her right lower extremity is in an abducted, sort of frog-leg   position.  She has moderate-to-severe tenderness to palpation  along the right   lateral aspect of the proximal thigh.  There is no evidence of open fracture.    No bruising.  She is warm peripherally.  She has 2+ DP pulses, equal and   symmetric.  She has good capillary refill throughout.  NEUROLOGIC:  Intact sensation to soft touch throughout, nonfocal.  MUSCULOSKELETAL:  Limited range of motion of the right lower extremity on hip   flexion and extension secondary to pain, normal on the left.   SKIN:  No rashes, lesions, or excoriations.  PSYCHOLOGICAL:  Appropriate affect, A and O x4.    LABORATORY DATA/IMAGING:  CBC remarkable for white blood cell count of 9.0, H   and H 10.5 and 32.9, platelet count is 290, MCV is 65.3.  CMP:  Sodium 137,   potassium 4.4, glucose 188, BUN and creatinine 34 and 0.96.  AST and ALT 33   and 17.  Troponin less than 0.01.  PT/INR 12.7 and 0.93, PTT 24.3.    X-ray of the hip:  Right intertrochanteric femoral neck fracture.    Portable chest x-ray.  No acute cardiac or pulmonary abnormalities are   identified.    EKG personally reviewed by me, no old for comparison.  She has T-wave   inversions, V2 through V6 at sinus rhythm, heart rate 69.  No evidence of   other acute ST segment changes.    ASSESSMENT:  1. Right femoral neck fracture.  2. Ground level fall.  3. Microcytic anemia.  4. Diabetes mellitus, type 2.  5. Old T-wave inversions on EKG, unknown if new  or old given no prior EKGs for   comparison.  6. Tobacco abuse.    PLAN:  The patient will be admitted to the orthopedic unit.  Dr. Jean Carlos Ozuna   has taken the patient to the OR where she underwent a successful open   treatment with intramedullary nailing, right displaced intertrochanteric femur   fracture.  Patient tolerated this procedure well.  I am going to check an   iron panel.  She might need a GI workup given her profound microcytic anemia.    Additionally, I will hold her outpatient diabetes medications, do an insulin   sliding scale here.  I will do appropriate pain control.  Also get an   echocardiogram and carotid as she has a history of dizziness, falling, or near   syncope at home as well as old T-wave inversions.  We will continue to trend   EKGs.  PT and OT.    CODE STATUS:  Full code.    DIET:  Cardiac, diabetic.    DVT prophylaxis:  Heparin for prophylaxis per Orthopedic Surgery.       ____________________________________     MD CAMDEN SHORT / WAN    DD:  03/04/2017 20:15:25  DT:  03/04/2017 21:43:29    D#:  [id number]  Job#:  [id number]

## 2017-03-05 NOTE — OR NURSING
Patient arrived from OR, order received for stat troponin per Dr Goss. Order placed in epic,  called.

## 2017-03-05 NOTE — OR NURSING
Daughter Ledy at bedside, translating to patient. Patient denies any pain or nausea at this time. CMS intact in right foot. 2+ pulse felt. SCDs in place

## 2017-03-05 NOTE — PROGRESS NOTES
Hospital Medicine Progress Note, Adult, Complex               Author: Ofelia Hernandez Date & Time created: 3/5/2017  9:02 AM     CC: 71F presented with right hip pain after mechanical GLF and admitted for intertrochanteric fracture now s/p repair    Interval History:  Patient with uncontrolled pain, she has not been asking for medications and pain is at 8/10 this AM.  Discussed with son and Dr. Ozuna also at bedside, will need PT/OT and likely SNF.  Son will encourage mother to verbalize pain and ask for medications as needed.    Review of Systems:  Review of Systems   Constitutional: Negative for fever and chills.   HENT: Negative for congestion and sore throat.    Eyes: Negative for photophobia.   Respiratory: Negative for cough, shortness of breath and wheezing.    Cardiovascular: Negative for chest pain and palpitations.   Gastrointestinal: Negative for nausea, vomiting, abdominal pain and diarrhea.   Genitourinary: Negative for dysuria.   Musculoskeletal: Positive for joint pain (Right hip pain). Negative for myalgias.   Skin: Negative.    Neurological: Negative for dizziness, tingling, focal weakness and headaches.   Psychiatric/Behavioral: Negative for depression and suicidal ideas.       Physical Exam:  Physical Exam   Constitutional: She is oriented to person, place, and time. No distress.   HENT:   Head: Normocephalic and atraumatic.   Right Ear: External ear normal.   Left Ear: External ear normal.   Eyes: EOM are normal. Right eye exhibits no discharge. Left eye exhibits no discharge.   Neck: Neck supple. No JVD present.   Cardiovascular: Normal rate, regular rhythm and normal heart sounds.    Pulmonary/Chest: Effort normal and breath sounds normal. No respiratory distress. She exhibits no tenderness.   Abdominal: Soft. Bowel sounds are normal. She exhibits no distension. There is no tenderness.   Musculoskeletal: Normal range of motion. She exhibits tenderness (Over right hip). She exhibits no edema.    Neurological: She is alert and oriented to person, place, and time. No cranial nerve deficit.   Skin: Skin is warm and dry. She is not diaphoretic. No erythema.   Psychiatric: She has a normal mood and affect. Her behavior is normal.   Nursing note and vitals reviewed.      Labs:        Invalid input(s): [id number]  Recent Labs      17   1641   TROPONINI  <0.01     Recent Labs      17   10017   0409   SODIUM  137  140   POTASSIUM  4.4  4.4   CHLORIDE  104  112   CO2  22  24   BUN  34*  20   CREATININE  0.96  0.91   MAGNESIUM   --   2.1   CALCIUM  9.6  8.2*     Recent Labs      17   0409   ALTSGPT  17   --    ASTSGOT  33   --    ALKPHOSPHAT  41   --    TBILIRUBIN  0.4   --    GLUCOSE  188*  144*     Recent Labs      17   0409   RBC  5.04  4.01*   HEMOGLOBIN  10.5*  8.2*   HEMATOCRIT  32.9*  27.0*   PLATELETCT  290  235   PROTHROMBTM  12.7   --    APTT  24.3*   --    INR  0.93   --    IRON  26*   --    FERRITIN  194.2   --    TOTIRONBC  374   --      Recent Labs      17   0409   WBC  9.0  7.3   NEUTSPOLYS  77.00*   --    LYMPHOCYTES  13.20*   --    MONOCYTES  6.90   --    EOSINOPHILS  2.10   --    BASOPHILS  0.40   --    ASTSGOT  33   --    ALTSGPT  17   --    ALKPHOSPHAT  41   --    TBILIRUBIN  0.4   --            Hemodynamics:  Temp (24hrs), Av.1 °C (98.8 °F), Min:36.5 °C (97.7 °F), Max:37.9 °C (100.2 °F)  Temperature: 37.1 °C (98.7 °F)  Pulse  Av.4  Min: 64  Max: 74Heart Rate (Monitored): 72  Blood Pressure : 145/63 mmHg, NIBP: 131/69 mmHg     Respiratory:    Respiration: 18, Pulse Oximetry: 99 %, O2 Daily Delivery Respiratory : Room Air with O2 Available        RUL Breath Sounds: Clear, RML Breath Sounds: Clear, RLL Breath Sounds: Clear, TRUDI Breath Sounds: Clear, LLL Breath Sounds: Clear  Fluids:  No intake or output data in the 24 hours ending 17 0902  Weight: 40.824 kg (90 lb)  GI/Nutrition:  Orders  Placed This Encounter   Procedures   • DIET ORDER     Standing Status: Standing      Number of Occurrences: 1      Standing Expiration Date:      Order Specific Question:  Diet:     Answer:  Diabetic [3]     Medical Decision Making, by Problem:  Active Hospital Problems    Diagnosis   • Femoral neck fracture (CMS-HCC) [S72.009A]  - s/p IM nail of right displaced intertrochanteric femur fracture  - WBAT per ortho  - Needs PT/OT and likely rehab or SNF  - DVT ppx with heparin  - Pain management     • Microcytic anemia, likely chronic disease  - Fe and % saturation low but TIBC normal  - Will start oral Fe  - ECHO for recent syncope pending  - Monitor and transfuse if HgB <7     • DM type 2 (diabetes mellitus, type 2) (CMS-HCC) [E11.9]  - ISS for now with accuchecks     More than 35 minutes was spent in pt exam, interview, and more than 60 % of this in discussion of plan, diagnoses, prognosis and disposition with patient and/or family, nurse and case management coordinator.      Labs reviewed and Medications reviewed        DVT Prophylaxis: Heparin

## 2017-03-06 LAB
ABO GROUP BLD: NORMAL
ABO GROUP BLD: NORMAL
ANION GAP SERPL CALC-SCNC: 4 MMOL/L (ref 0–11.9)
BARCODED ABORH UBTYP: 6200
BARCODED ABORH UBTYP: 6200
BARCODED PRD CODE UBPRD: NORMAL
BARCODED PRD CODE UBPRD: NORMAL
BARCODED UNIT NUM UBUNT: NORMAL
BARCODED UNIT NUM UBUNT: NORMAL
BASOPHILS # BLD AUTO: 0.3 % (ref 0–1.8)
BASOPHILS # BLD: 0.03 K/UL (ref 0–0.12)
BLD GP AB SCN SERPL QL: NORMAL
BUN SERPL-MCNC: 15 MG/DL (ref 8–22)
CALCIUM SERPL-MCNC: 7.7 MG/DL (ref 8.5–10.5)
CHLORIDE SERPL-SCNC: 108 MMOL/L (ref 96–112)
CO2 SERPL-SCNC: 24 MMOL/L (ref 20–33)
COMPONENT R 8504R: NORMAL
COMPONENT R 8504R: NORMAL
CREAT SERPL-MCNC: 0.67 MG/DL (ref 0.5–1.4)
EOSINOPHIL # BLD AUTO: 0.16 K/UL (ref 0–0.51)
EOSINOPHIL NFR BLD: 1.8 % (ref 0–6.9)
ERYTHROCYTE [DISTWIDTH] IN BLOOD BY AUTOMATED COUNT: 36.6 FL (ref 35.9–50)
ERYTHROCYTE [DISTWIDTH] IN BLOOD BY AUTOMATED COUNT: 48.1 FL (ref 35.9–50)
GFR SERPL CREATININE-BSD FRML MDRD: >60 ML/MIN/1.73 M 2
GLUCOSE BLD-MCNC: 112 MG/DL (ref 65–99)
GLUCOSE BLD-MCNC: 143 MG/DL (ref 65–99)
GLUCOSE BLD-MCNC: 179 MG/DL (ref 65–99)
GLUCOSE BLD-MCNC: 189 MG/DL (ref 65–99)
GLUCOSE SERPL-MCNC: 122 MG/DL (ref 65–99)
HCT VFR BLD AUTO: 21.6 % (ref 37–47)
HCT VFR BLD AUTO: 28.1 % (ref 37–47)
HGB BLD-MCNC: 6.7 G/DL (ref 12–16)
HGB BLD-MCNC: 8.7 G/DL (ref 12–16)
IMM GRANULOCYTES # BLD AUTO: 0.08 K/UL (ref 0–0.11)
IMM GRANULOCYTES NFR BLD AUTO: 0.9 % (ref 0–0.9)
LYMPHOCYTES # BLD AUTO: 1.51 K/UL (ref 1–4.8)
LYMPHOCYTES NFR BLD: 17.4 % (ref 22–41)
MCH RBC QN AUTO: 20.6 PG (ref 27–33)
MCH RBC QN AUTO: 22 PG (ref 27–33)
MCHC RBC AUTO-ENTMCNC: 31 G/DL (ref 33.6–35)
MCHC RBC AUTO-ENTMCNC: 31 G/DL (ref 33.6–35)
MCV RBC AUTO: 66.5 FL (ref 81.4–97.8)
MCV RBC AUTO: 71 FL (ref 81.4–97.8)
MONOCYTES # BLD AUTO: 0.83 K/UL (ref 0–0.85)
MONOCYTES NFR BLD AUTO: 9.6 % (ref 0–13.4)
NEUTROPHILS # BLD AUTO: 6.08 K/UL (ref 2–7.15)
NEUTROPHILS NFR BLD: 70 % (ref 44–72)
NRBC # BLD AUTO: 0 K/UL
NRBC BLD AUTO-RTO: 0 /100 WBC
PLATELET # BLD AUTO: 179 K/UL (ref 164–446)
PLATELET # BLD AUTO: 186 K/UL (ref 164–446)
PMV BLD AUTO: 10 FL (ref 9–12.9)
PMV BLD AUTO: 9.5 FL (ref 9–12.9)
POTASSIUM SERPL-SCNC: 4 MMOL/L (ref 3.6–5.5)
PRODUCT TYPE UPROD: NORMAL
PRODUCT TYPE UPROD: NORMAL
RBC # BLD AUTO: 3.25 M/UL (ref 4.2–5.4)
RBC # BLD AUTO: 3.96 M/UL (ref 4.2–5.4)
RH BLD: NORMAL
SODIUM SERPL-SCNC: 136 MMOL/L (ref 135–145)
UNIT STATUS USTAT: NORMAL
UNIT STATUS USTAT: NORMAL
WBC # BLD AUTO: 8.4 K/UL (ref 4.8–10.8)
WBC # BLD AUTO: 8.7 K/UL (ref 4.8–10.8)

## 2017-03-06 PROCEDURE — 36430 TRANSFUSION BLD/BLD COMPNT: CPT

## 2017-03-06 PROCEDURE — 85025 COMPLETE CBC W/AUTO DIFF WBC: CPT

## 2017-03-06 PROCEDURE — 86850 RBC ANTIBODY SCREEN: CPT

## 2017-03-06 PROCEDURE — 700111 HCHG RX REV CODE 636 W/ 250 OVERRIDE (IP): Performed by: HOSPITALIST

## 2017-03-06 PROCEDURE — 30233N1 TRANSFUSION OF NONAUTOLOGOUS RED BLOOD CELLS INTO PERIPHERAL VEIN, PERCUTANEOUS APPROACH: ICD-10-PCS | Performed by: INTERNAL MEDICINE

## 2017-03-06 PROCEDURE — 700105 HCHG RX REV CODE 258

## 2017-03-06 PROCEDURE — 700102 HCHG RX REV CODE 250 W/ 637 OVERRIDE(OP): Performed by: HOSPITALIST

## 2017-03-06 PROCEDURE — A9270 NON-COVERED ITEM OR SERVICE: HCPCS | Performed by: ORTHOPAEDIC SURGERY

## 2017-03-06 PROCEDURE — 36415 COLL VENOUS BLD VENIPUNCTURE: CPT

## 2017-03-06 PROCEDURE — 770006 HCHG ROOM/CARE - MED/SURG/GYN SEMI*

## 2017-03-06 PROCEDURE — 82962 GLUCOSE BLOOD TEST: CPT

## 2017-03-06 PROCEDURE — 700102 HCHG RX REV CODE 250 W/ 637 OVERRIDE(OP): Performed by: INTERNAL MEDICINE

## 2017-03-06 PROCEDURE — 700111 HCHG RX REV CODE 636 W/ 250 OVERRIDE (IP): Performed by: ORTHOPAEDIC SURGERY

## 2017-03-06 PROCEDURE — 80048 BASIC METABOLIC PNL TOTAL CA: CPT

## 2017-03-06 PROCEDURE — 700102 HCHG RX REV CODE 250 W/ 637 OVERRIDE(OP): Performed by: ORTHOPAEDIC SURGERY

## 2017-03-06 PROCEDURE — A9270 NON-COVERED ITEM OR SERVICE: HCPCS | Performed by: HOSPITALIST

## 2017-03-06 PROCEDURE — 99233 SBSQ HOSP IP/OBS HIGH 50: CPT | Performed by: HOSPITALIST

## 2017-03-06 PROCEDURE — 86900 BLOOD TYPING SEROLOGIC ABO: CPT

## 2017-03-06 PROCEDURE — 700111 HCHG RX REV CODE 636 W/ 250 OVERRIDE (IP): Performed by: INTERNAL MEDICINE

## 2017-03-06 PROCEDURE — P9016 RBC LEUKOCYTES REDUCED: HCPCS

## 2017-03-06 PROCEDURE — 85027 COMPLETE CBC AUTOMATED: CPT

## 2017-03-06 PROCEDURE — A9270 NON-COVERED ITEM OR SERVICE: HCPCS | Performed by: INTERNAL MEDICINE

## 2017-03-06 PROCEDURE — 86901 BLOOD TYPING SEROLOGIC RH(D): CPT

## 2017-03-06 PROCEDURE — 86923 COMPATIBILITY TEST ELECTRIC: CPT

## 2017-03-06 RX ORDER — SODIUM CHLORIDE 9 MG/ML
INJECTION, SOLUTION INTRAVENOUS
Status: COMPLETED
Start: 2017-03-06 | End: 2017-03-06

## 2017-03-06 RX ORDER — ACETAMINOPHEN 325 MG/1
650 TABLET ORAL EVERY 6 HOURS PRN
Status: DISCONTINUED | OUTPATIENT
Start: 2017-03-06 | End: 2017-03-15 | Stop reason: HOSPADM

## 2017-03-06 RX ORDER — DIPHENHYDRAMINE HCL 25 MG
25 TABLET ORAL EVERY 6 HOURS PRN
Status: DISCONTINUED | OUTPATIENT
Start: 2017-03-06 | End: 2017-03-15 | Stop reason: HOSPADM

## 2017-03-06 RX ADMIN — HEPARIN SODIUM 5000 UNITS: 5000 INJECTION, SOLUTION INTRAVENOUS; SUBCUTANEOUS at 15:24

## 2017-03-06 RX ADMIN — OXYCODONE HYDROCHLORIDE 5 MG: 5 TABLET ORAL at 01:14

## 2017-03-06 RX ADMIN — NICOTINE 14 MG: 14 PATCH TRANSDERMAL at 05:53

## 2017-03-06 RX ADMIN — INSULIN LISPRO 1 UNITS: 100 INJECTION, SOLUTION INTRAVENOUS; SUBCUTANEOUS at 11:43

## 2017-03-06 RX ADMIN — HYDROMORPHONE HYDROCHLORIDE 0.5 MG: 1 INJECTION, SOLUTION INTRAMUSCULAR; INTRAVENOUS; SUBCUTANEOUS at 15:04

## 2017-03-06 RX ADMIN — SODIUM CHLORIDE 1000 ML: 9 INJECTION, SOLUTION INTRAVENOUS at 00:00

## 2017-03-06 RX ADMIN — ACETAMINOPHEN 650 MG: 325 TABLET, FILM COATED ORAL at 21:00

## 2017-03-06 RX ADMIN — SODIUM CHLORIDE: 9 INJECTION, SOLUTION INTRAVENOUS at 12:30

## 2017-03-06 RX ADMIN — OXYCODONE HYDROCHLORIDE 10 MG: 10 TABLET ORAL at 15:47

## 2017-03-06 RX ADMIN — HEPARIN SODIUM 5000 UNITS: 5000 INJECTION, SOLUTION INTRAVENOUS; SUBCUTANEOUS at 20:47

## 2017-03-06 RX ADMIN — Medication 2 TABLET: at 08:41

## 2017-03-06 RX ADMIN — OXYCODONE HYDROCHLORIDE 10 MG: 10 TABLET ORAL at 20:36

## 2017-03-06 RX ADMIN — INSULIN LISPRO 1 UNITS: 100 INJECTION, SOLUTION INTRAVENOUS; SUBCUTANEOUS at 20:44

## 2017-03-06 RX ADMIN — HEPARIN SODIUM 5000 UNITS: 5000 INJECTION, SOLUTION INTRAVENOUS; SUBCUTANEOUS at 05:54

## 2017-03-06 RX ADMIN — OXYCODONE HYDROCHLORIDE 5 MG: 5 TABLET ORAL at 11:47

## 2017-03-06 RX ADMIN — OXYCODONE HYDROCHLORIDE 5 MG: 5 TABLET ORAL at 05:58

## 2017-03-06 RX ADMIN — ONDANSETRON 4 MG: 4 TABLET, ORALLY DISINTEGRATING ORAL at 23:59

## 2017-03-06 ASSESSMENT — PAIN SCALES - GENERAL
PAINLEVEL_OUTOF10: 3
PAINLEVEL_OUTOF10: 8
PAINLEVEL_OUTOF10: 4
PAINLEVEL_OUTOF10: 6
PAINLEVEL_OUTOF10: 5
PAINLEVEL_OUTOF10: 8
PAINLEVEL_OUTOF10: 2
PAINLEVEL_OUTOF10: 3
PAINLEVEL_OUTOF10: 2

## 2017-03-06 ASSESSMENT — ENCOUNTER SYMPTOMS
CHILLS: 0
MYALGIAS: 0
NAUSEA: 0
COUGH: 0
SHORTNESS OF BREATH: 0
DEPRESSION: 0
TINGLING: 0
VOMITING: 0
PALPITATIONS: 0
FEVER: 0
ABDOMINAL PAIN: 0
DIZZINESS: 0
HEADACHES: 0
FOCAL WEAKNESS: 0
PHOTOPHOBIA: 0
DIARRHEA: 0
SORE THROAT: 0
WHEEZING: 0

## 2017-03-06 NOTE — CARE PLAN
Problem: Communication  Goal: The ability to communicate needs accurately and effectively will improve  Patient does not speak English, family members in room who assist with interpreting and providing assistance. RN encouraged family members to voice any concerns or questions during hospital stay.    Problem: Pain Management  Goal: Pain level will decrease to patient’s comfort goal  Intervention: Follow pain managment plan developed in collaboration with patient and Interdisciplinary Team  Scheduled PRN pain medications given as ordered, RN also implements non-pharmacologic comfort measures such as cold pack, repositioning and distraction.

## 2017-03-06 NOTE — PROGRESS NOTES
Hospital Medicine Progress Note, Adult, Complex               Author: Ofelia Hernandez Date & Time created: 3/6/2017  8:46 AM     CC: 71F presented with right hip pain after mechanical GLF and admitted for intertrochanteric fracture now s/p repair    Interval History:  3/5 - Patient with uncontrolled pain, she has not been asking for medications and pain is at 8/10 this AM.  Discussed with son and Dr. Ozuna also at bedside, will need PT/OT and likely SNF.  Son will encourage mother to verbalize pain and ask for medications as needed.  3/6 - Pain much better today but dizzy and noted Hgb 6.7    Review of Systems:  Review of Systems   Constitutional: Negative for fever and chills.   HENT: Negative for congestion and sore throat.    Eyes: Negative for photophobia.   Respiratory: Negative for cough, shortness of breath and wheezing.    Cardiovascular: Negative for chest pain and palpitations.   Gastrointestinal: Negative for nausea, vomiting, abdominal pain and diarrhea.   Genitourinary: Negative for dysuria.   Musculoskeletal: Positive for joint pain (Right hip pain). Negative for myalgias.   Skin: Negative.    Neurological: Negative for dizziness, tingling, focal weakness and headaches.   Psychiatric/Behavioral: Negative for depression and suicidal ideas.       Physical Exam:  Physical Exam   Constitutional: She is oriented to person, place, and time. No distress.   HENT:   Head: Normocephalic and atraumatic.   Right Ear: External ear normal.   Left Ear: External ear normal.   Eyes: EOM are normal. Right eye exhibits no discharge. Left eye exhibits no discharge.   Neck: Neck supple. No JVD present.   Cardiovascular: Normal rate, regular rhythm and normal heart sounds.    Pulmonary/Chest: Effort normal and breath sounds normal. No respiratory distress. She exhibits no tenderness.   Abdominal: Soft. Bowel sounds are normal. She exhibits no distension. There is no tenderness.   Musculoskeletal: Normal range of motion. She  exhibits tenderness (Over right hip). She exhibits no edema.   Neurological: She is alert and oriented to person, place, and time. No cranial nerve deficit.   Skin: Skin is warm and dry. She is not diaphoretic. No erythema.   Psychiatric: She has a normal mood and affect. Her behavior is normal.   Nursing note and vitals reviewed.      Labs:        Invalid input(s): [id number]  Recent Labs      17   1641   TROPONINI  <0.01     Recent Labs      17   032   SODIUM  137  140  136   POTASSIUM  4.4  4.4  4.0   CHLORIDE  104  112  108   CO2  22  24  24   BUN  34*  20  15   CREATININE  0.96  0.91  0.67   MAGNESIUM   --   2.1   --    CALCIUM  9.6  8.2*  7.7*     Recent Labs      17   ALTSGPT  17   --    --    ASTSGOT  33   --    --    ALKPHOSPHAT  41   --    --    TBILIRUBIN  0.4   --    --    GLUCOSE  188*  144*  122*     Recent Labs      17   RBC  5.04  4.01*  3.25*   HEMOGLOBIN  10.5*  8.2*  6.7*   HEMATOCRIT  32.9*  27.0*  21.6*   PLATELETCT  290  235  186   PROTHROMBTM  12.7   --    --    APTT  24.3*   --    --    INR  0.93   --    --    IRON  26*   --    --    FERRITIN  194.2   --    --    TOTIRONBC  374   --    --      Recent Labs      17   WBC  9.0  7.3  8.4   NEUTSPOLYS  77.00*   --    --    LYMPHOCYTES  13.20*   --    --    MONOCYTES  6.90   --    --    EOSINOPHILS  2.10   --    --    BASOPHILS  0.40   --    --    ASTSGOT  33   --    --    ALTSGPT  17   --    --    ALKPHOSPHAT  41   --    --    TBILIRUBIN  0.4   --    --            Hemodynamics:  Temp (24hrs), Av °C (98.6 °F), Min:36.4 °C (97.5 °F), Max:37.6 °C (99.7 °F)  Temperature: 36.4 °C (97.5 °F)  Pulse  Av.7  Min: 64  Max: 74   Blood Pressure : 103/47 mmHg     Respiratory:    Respiration: (!) 8, Pulse Oximetry: 100 %        RUL Breath Sounds: Clear, RML Breath  Sounds: Clear, RLL Breath Sounds: Clear, TRUDI Breath Sounds: Clear, LLL Breath Sounds: Clear  Fluids:    Intake/Output Summary (Last 24 hours) at 03/06/17 0846  Last data filed at 03/06/17 0800   Gross per 24 hour   Intake    120 ml   Output      0 ml   Net    120 ml        GI/Nutrition:  Orders Placed This Encounter   Procedures   • DIET ORDER     Standing Status: Standing      Number of Occurrences: 1      Standing Expiration Date:      Order Specific Question:  Diet:     Answer:  Diabetic [3]     Medical Decision Making, by Problem:  Active Hospital Problems    Diagnosis   • Femoral neck fracture (CMS-HCC) [S72.009A]  - s/p IM nail of right displaced intertrochanteric femur fracture  - WBAT per ortho  - Needs PT/OT and likely rehab or SNF  - DVT ppx with heparin  - Pain management     • Microcytic anemia, likely chronic disease  - Acute on chronic with postop anemia, Hgb 6.7 today, will gt COD and transfuse 1U  - Fe and % saturation low but TIBC normal  - Cotinue oral Fe  - ECHO for recent syncope pending     • DM type 2 (diabetes mellitus, type 2) (CMS-HCC) [E11.9]  - ISS for now with accuchecks     More than 35 minutes was spent in pt exam, interview, and more than 60 % of this in discussion of plan, diagnoses, prognosis and disposition with patient and/or family, nurse and case management coordinator.      Labs reviewed and Medications reviewed        DVT Prophylaxis: Heparin

## 2017-03-06 NOTE — PROGRESS NOTES
71yoF with right IT femur fx s/p IMN 3/4.    S: Daughter at bedside.  Pain improved.  Complaining of dizziness.  Hgb 6.7 this am.  Pending blood transfusion.    O:  Filed Vitals:    03/05/17 2000 03/06/17 0400 03/06/17 0800 03/06/17 0806   BP: 115/63 106/41 110/45 103/47   Pulse: 64 64 67 69   Temp:  36.9 °C (98.5 °F) 37.6 °C (99.7 °F) 36.4 °C (97.5 °F)   Resp: 15 15 17 8   Height:       Weight:       SpO2: 100% 99% 98% 100%     Exam:  General-NAD, sitting upright in bed eating breakfast, following commands, alert  RLE-dressing c/d/i, +EHL/FHL/TA/GS motor, SILT in foot and palp dp pulse    Hct: 21.6    A: 71yoF with right IT femur fx s/p IMN 3/4.  Pain improved.  Acute blood loss anemia with likely underlying chronic anemia, symptomatic.     PLAN:  --WBAT RLE  --continue PT/OT   --okay to heparin until Hgb/Hct stabilized, continue SCDs  --continue pain control  --agree with transfusion PRBCs today  --will likely need SNF placement  --fu 2 weeks postop

## 2017-03-06 NOTE — PROGRESS NOTES
"Recent Labs      03/04/17   1005  03/05/17   0409  03/06/17   0325   HEMOGLOBIN  10.5*  8.2*  6.7*   HEMATOCRIT  32.9*  27.0*  21.6*   MCV  65.3*  67.6*  66.5*   MCH  20.8*  20.2*  20.6*   PLATELETCT  290  235  186     Assumed morning care of pt. Patient c/o of dizziness when HOB elevated.  Pt A+Ox4 able to make needs known.  Daughter at bedside to help translate. Tolerating fluids and solids well. PIV patent running NS at 75 ml.  Dressing to RLE DCI.  CSMT's & Neuro's WNL,  SCD's in place.   Labs reviewed, Hgb 6.7, Hct 21.6, Paged Dr Ozuna to up date pt on H&H and c/o of dizziness. Wait for call back.   /47 mmHg  Pulse 69  Temp(Src) 36.4 °C (97.5 °F)  Resp 8  Ht 1.473 m (4' 10\")  Wt 40.824 kg (90 lb)  BMI 18.82 kg/m2  SpO2 100%  Breastfeeding? No   Hospitalist paged  and charge nurse made aware.    Dr Hernandez aware see new telephone orders placed.   1245:  /47 mmHg  Pulse 78  Temp(Src) 36.8 °C (98.2 °F)  Resp 16  Ht 1.473 m (4' 10\")  Wt 40.824 kg (90 lb)  BMI 18.82 kg/m2  SpO2 90%  Breastfeeding? No  Blood transfusion started, Second nurse verification. Protocol followed.  Pt A+Ox4 able to make needs known.     1530:  /60 mmHg  Pulse 84  Temp(Src) 37.9 °C (100.2 °F)  Resp 16  Ht 1.473 m (4' 10\")  Wt 40.824 kg (90 lb)  BMI 18.82 kg/m2  SpO2 96%  Breastfeeding? No  Blood transfusion completed pt A+Ox4 able to make needs known. Pain after PRN pain medication 2/10. Pt now resting comfortably, denies light headiness or dizziness with sitting up in bed.  H&H to be re-drawn in one hour.   "

## 2017-03-07 LAB
ANION GAP SERPL CALC-SCNC: 5 MMOL/L (ref 0–11.9)
BUN SERPL-MCNC: 16 MG/DL (ref 8–22)
CALCIUM SERPL-MCNC: 7.7 MG/DL (ref 8.5–10.5)
CHLORIDE SERPL-SCNC: 105 MMOL/L (ref 96–112)
CO2 SERPL-SCNC: 24 MMOL/L (ref 20–33)
CREAT SERPL-MCNC: 0.7 MG/DL (ref 0.5–1.4)
ERYTHROCYTE [DISTWIDTH] IN BLOOD BY AUTOMATED COUNT: 44.3 FL (ref 35.9–50)
GFR SERPL CREATININE-BSD FRML MDRD: >60 ML/MIN/1.73 M 2
GLUCOSE BLD-MCNC: 163 MG/DL (ref 65–99)
GLUCOSE BLD-MCNC: 165 MG/DL (ref 65–99)
GLUCOSE BLD-MCNC: 187 MG/DL (ref 65–99)
GLUCOSE BLD-MCNC: 201 MG/DL (ref 65–99)
GLUCOSE BLD-MCNC: 219 MG/DL (ref 65–99)
GLUCOSE SERPL-MCNC: 157 MG/DL (ref 65–99)
HCT VFR BLD AUTO: 25.7 % (ref 37–47)
HGB BLD-MCNC: 8.1 G/DL (ref 12–16)
MCH RBC QN AUTO: 22.2 PG (ref 27–33)
MCHC RBC AUTO-ENTMCNC: 31.5 G/DL (ref 33.6–35)
MCV RBC AUTO: 70.4 FL (ref 81.4–97.8)
PLATELET # BLD AUTO: 182 K/UL (ref 164–446)
PMV BLD AUTO: 10.2 FL (ref 9–12.9)
POTASSIUM SERPL-SCNC: 3.7 MMOL/L (ref 3.6–5.5)
RBC # BLD AUTO: 3.65 M/UL (ref 4.2–5.4)
SODIUM SERPL-SCNC: 134 MMOL/L (ref 135–145)
WBC # BLD AUTO: 9 K/UL (ref 4.8–10.8)

## 2017-03-07 PROCEDURE — 700111 HCHG RX REV CODE 636 W/ 250 OVERRIDE (IP): Performed by: NURSE PRACTITIONER

## 2017-03-07 PROCEDURE — 770006 HCHG ROOM/CARE - MED/SURG/GYN SEMI*

## 2017-03-07 PROCEDURE — 82962 GLUCOSE BLOOD TEST: CPT

## 2017-03-07 PROCEDURE — A9270 NON-COVERED ITEM OR SERVICE: HCPCS | Performed by: INTERNAL MEDICINE

## 2017-03-07 PROCEDURE — 700102 HCHG RX REV CODE 250 W/ 637 OVERRIDE(OP): Performed by: INTERNAL MEDICINE

## 2017-03-07 PROCEDURE — A9270 NON-COVERED ITEM OR SERVICE: HCPCS | Performed by: ORTHOPAEDIC SURGERY

## 2017-03-07 PROCEDURE — 36415 COLL VENOUS BLD VENIPUNCTURE: CPT

## 2017-03-07 PROCEDURE — 700111 HCHG RX REV CODE 636 W/ 250 OVERRIDE (IP): Performed by: ORTHOPAEDIC SURGERY

## 2017-03-07 PROCEDURE — 85027 COMPLETE CBC AUTOMATED: CPT

## 2017-03-07 PROCEDURE — G8987 SELF CARE CURRENT STATUS: HCPCS | Mod: CK

## 2017-03-07 PROCEDURE — 80048 BASIC METABOLIC PNL TOTAL CA: CPT

## 2017-03-07 PROCEDURE — G8988 SELF CARE GOAL STATUS: HCPCS | Mod: CI

## 2017-03-07 PROCEDURE — 700102 HCHG RX REV CODE 250 W/ 637 OVERRIDE(OP): Performed by: ORTHOPAEDIC SURGERY

## 2017-03-07 PROCEDURE — 97166 OT EVAL MOD COMPLEX 45 MIN: CPT

## 2017-03-07 RX ADMIN — INSULIN LISPRO 1 UNITS: 100 INJECTION, SOLUTION INTRAVENOUS; SUBCUTANEOUS at 06:32

## 2017-03-07 RX ADMIN — Medication 2 TABLET: at 21:12

## 2017-03-07 RX ADMIN — INSULIN LISPRO 2 UNITS: 100 INJECTION, SOLUTION INTRAVENOUS; SUBCUTANEOUS at 16:55

## 2017-03-07 RX ADMIN — OXYCODONE HYDROCHLORIDE 10 MG: 10 TABLET ORAL at 21:12

## 2017-03-07 RX ADMIN — OXYCODONE HYDROCHLORIDE 10 MG: 10 TABLET ORAL at 06:27

## 2017-03-07 RX ADMIN — INSULIN LISPRO 2 UNITS: 100 INJECTION, SOLUTION INTRAVENOUS; SUBCUTANEOUS at 21:23

## 2017-03-07 RX ADMIN — PROCHLORPERAZINE EDISYLATE 10 MG: 5 INJECTION INTRAMUSCULAR; INTRAVENOUS at 03:13

## 2017-03-07 RX ADMIN — Medication 2 TABLET: at 11:01

## 2017-03-07 RX ADMIN — OXYCODONE HYDROCHLORIDE 10 MG: 10 TABLET ORAL at 00:36

## 2017-03-07 RX ADMIN — OXYCODONE HYDROCHLORIDE 5 MG: 5 TABLET ORAL at 16:50

## 2017-03-07 RX ADMIN — NICOTINE 14 MG: 14 PATCH TRANSDERMAL at 06:27

## 2017-03-07 RX ADMIN — HEPARIN SODIUM 5000 UNITS: 5000 INJECTION, SOLUTION INTRAVENOUS; SUBCUTANEOUS at 06:27

## 2017-03-07 RX ADMIN — OXYCODONE HYDROCHLORIDE 10 MG: 10 TABLET ORAL at 11:01

## 2017-03-07 ASSESSMENT — ENCOUNTER SYMPTOMS
TINGLING: 0
DIZZINESS: 0
COUGH: 0
SORE THROAT: 0
FEVER: 0
WHEEZING: 0
CHILLS: 0
PALPITATIONS: 0
FOCAL WEAKNESS: 0
DIARRHEA: 0
HEADACHES: 0
SHORTNESS OF BREATH: 0
DEPRESSION: 0
VOMITING: 0
ABDOMINAL PAIN: 0
PHOTOPHOBIA: 0
NAUSEA: 0

## 2017-03-07 ASSESSMENT — PATIENT HEALTH QUESTIONNAIRE - PHQ9
SUM OF ALL RESPONSES TO PHQ9 QUESTIONS 1 AND 2: 0
SUM OF ALL RESPONSES TO PHQ QUESTIONS 1-9: 0
1. LITTLE INTEREST OR PLEASURE IN DOING THINGS: NOT AT ALL
2. FEELING DOWN, DEPRESSED, IRRITABLE, OR HOPELESS: NOT AT ALL

## 2017-03-07 ASSESSMENT — PAIN SCALES - GENERAL
PAINLEVEL_OUTOF10: 4
PAINLEVEL_OUTOF10: 6
PAINLEVEL_OUTOF10: 3
PAINLEVEL_OUTOF10: 8
PAINLEVEL_OUTOF10: 5
PAINLEVEL_OUTOF10: 3

## 2017-03-07 ASSESSMENT — ACTIVITIES OF DAILY LIVING (ADL): TOILETING: INDEPENDENT

## 2017-03-07 NOTE — PROGRESS NOTES
[id number]: patient reporting nausea, dizziness, and fatigue. Medicated with zofran. Blood sugar 163 and vital signs found to be normal. Patients hip with bruising and swelling.     Hospitalist notified, requested to have labs drawn early to check hbg.

## 2017-03-07 NOTE — CARE PLAN
Problem: Nutritional:  Goal: Achieve adequate nutritional intake  Patient will consume 50% of meals  Outcome: PROGRESSING SLOWER THAN EXPECTED

## 2017-03-07 NOTE — PROGRESS NOTES
In bed resting quietly. Weak, sleepy, does not want to eat. Boost supplements provided. Daughter at bedside.

## 2017-03-07 NOTE — PROGRESS NOTES
71yoF with right IT femur fx s/p IMN 3/4.    S: Daughter at bedside.  Pain improved.  Sleeping and still complaining of dizziness but better than yesterday.  Transfused yesterday.    O:  Filed Vitals:    03/07/17 0003 03/07/17 0400 03/07/17 0800 03/07/17 1100   BP: 133/63 116/69 124/51 135/66   Pulse: 68 64 69 64   Temp: 36.7 °C (98.1 °F) 36.9 °C (98.5 °F) 36.7 °C (98 °F) 37.2 °C (98.9 °F)   Resp:  16 16 12   Height:       Weight:       SpO2: 99% 99% 93% 98%     Exam:  General-NAD, somnolent but responsive and following commands  RLE-dressing coming up a little but overall clean and dry, +EHL/FHL/TA/GS motor, SILT in foot and palp dp pulse    Hct: 25.7 posttransfusion    A: 71yoF with right IT femur fx s/p IMN 3/4.  Pain improved.  Acute blood loss anemia with likely underlying chronic anemia improved posttransfusion.     PLAN:  --WBAT RLE  --continue PT/OT   --okay to hold heparin until Hgb/Hct stabilized, continue SCDs  --continue pain control  --recheck CBC tomorrow am  --will likely need SNF placement  --fu 2 weeks postop

## 2017-03-07 NOTE — DIETARY
"BMI - Pt seen for BMI <19 (=18.8), ht=4'10\", wt=40.8 kg. BMI on low end of normal.  Procedures: pt is s/p Open treatment with intramedullary nailing, right displaced intertrochanteric femur fracture on 3/4, per MD documentation.     Nutrition Services:  Per 2002 Nutritional Risk Screening guidelines, patient with score = 2.  Categorized as moderate level of impaired nutritional status, as evidenced by poor PO intake >4 days or more per nutrition admit screen, patient is >70 years old.  Pt is primarily Tagalog speaking; pt's daughter present at bedside for translation today.  Patient states PO intake of at least 50% on Diabetic diet at this time - she does not have much of an appetite for diabetic snacks in between meals.  Weight-wise, patient's daughter states that this is usual body weight for pt and she has not experienced any weight loss at this time.      Labs reviewed per 3/6 draw.  Meds: SSI, bowel care   +BM 3/4  No pressure ulcers noted per chart review.     PLAN/ RECOMMEND - Please encourage PO intake at meals and place order for supplements - Boost Glucose Control prn.  RD to send diabetic snacks in between meals BID to aid in promoting small, frequent PO intake.  Bowel med management per MD.    "

## 2017-03-08 LAB
ERYTHROCYTE [DISTWIDTH] IN BLOOD BY AUTOMATED COUNT: 43.8 FL (ref 35.9–50)
GLUCOSE BLD-MCNC: 138 MG/DL (ref 65–99)
GLUCOSE BLD-MCNC: 147 MG/DL (ref 65–99)
GLUCOSE BLD-MCNC: 209 MG/DL (ref 65–99)
GLUCOSE BLD-MCNC: 213 MG/DL (ref 65–99)
HCT VFR BLD AUTO: 24.4 % (ref 37–47)
HGB BLD-MCNC: 7.8 G/DL (ref 12–16)
MCH RBC QN AUTO: 22.2 PG (ref 27–33)
MCHC RBC AUTO-ENTMCNC: 32 G/DL (ref 33.6–35)
MCV RBC AUTO: 69.5 FL (ref 81.4–97.8)
PLATELET # BLD AUTO: 191 K/UL (ref 164–446)
PMV BLD AUTO: 10.3 FL (ref 9–12.9)
RBC # BLD AUTO: 3.51 M/UL (ref 4.2–5.4)
WBC # BLD AUTO: 7.8 K/UL (ref 4.8–10.8)

## 2017-03-08 PROCEDURE — A9270 NON-COVERED ITEM OR SERVICE: HCPCS | Performed by: HOSPITALIST

## 2017-03-08 PROCEDURE — 700102 HCHG RX REV CODE 250 W/ 637 OVERRIDE(OP): Performed by: ORTHOPAEDIC SURGERY

## 2017-03-08 PROCEDURE — 99232 SBSQ HOSP IP/OBS MODERATE 35: CPT | Performed by: HOSPITALIST

## 2017-03-08 PROCEDURE — 85027 COMPLETE CBC AUTOMATED: CPT

## 2017-03-08 PROCEDURE — 82962 GLUCOSE BLOOD TEST: CPT | Mod: 91

## 2017-03-08 PROCEDURE — 700102 HCHG RX REV CODE 250 W/ 637 OVERRIDE(OP): Performed by: INTERNAL MEDICINE

## 2017-03-08 PROCEDURE — A9270 NON-COVERED ITEM OR SERVICE: HCPCS | Performed by: INTERNAL MEDICINE

## 2017-03-08 PROCEDURE — 36415 COLL VENOUS BLD VENIPUNCTURE: CPT

## 2017-03-08 PROCEDURE — 770006 HCHG ROOM/CARE - MED/SURG/GYN SEMI*

## 2017-03-08 PROCEDURE — 700102 HCHG RX REV CODE 250 W/ 637 OVERRIDE(OP): Performed by: HOSPITALIST

## 2017-03-08 PROCEDURE — 97110 THERAPEUTIC EXERCISES: CPT

## 2017-03-08 PROCEDURE — 97530 THERAPEUTIC ACTIVITIES: CPT

## 2017-03-08 PROCEDURE — 97535 SELF CARE MNGMENT TRAINING: CPT

## 2017-03-08 PROCEDURE — A9270 NON-COVERED ITEM OR SERVICE: HCPCS | Performed by: ORTHOPAEDIC SURGERY

## 2017-03-08 PROCEDURE — 700111 HCHG RX REV CODE 636 W/ 250 OVERRIDE (IP): Performed by: ORTHOPAEDIC SURGERY

## 2017-03-08 RX ORDER — FERROUS GLUCONATE 324(38)MG
324 TABLET ORAL
Status: DISCONTINUED | OUTPATIENT
Start: 2017-03-08 | End: 2017-03-15 | Stop reason: HOSPADM

## 2017-03-08 RX ORDER — AMOXICILLIN 250 MG
1 CAPSULE ORAL ONCE
Status: DISPENSED | OUTPATIENT
Start: 2017-03-08 | End: 2017-03-09

## 2017-03-08 RX ADMIN — OXYCODONE HYDROCHLORIDE 5 MG: 5 TABLET ORAL at 10:59

## 2017-03-08 RX ADMIN — OXYCODONE HYDROCHLORIDE 10 MG: 10 TABLET ORAL at 22:02

## 2017-03-08 RX ADMIN — FERROUS GLUCONATE 324 MG: 324 TABLET ORAL at 10:59

## 2017-03-08 RX ADMIN — OXYCODONE HYDROCHLORIDE 5 MG: 5 TABLET ORAL at 06:11

## 2017-03-08 RX ADMIN — INSULIN LISPRO 2 UNITS: 100 INJECTION, SOLUTION INTRAVENOUS; SUBCUTANEOUS at 11:42

## 2017-03-08 RX ADMIN — NICOTINE 14 MG: 14 PATCH TRANSDERMAL at 06:11

## 2017-03-08 RX ADMIN — OXYCODONE HYDROCHLORIDE 5 MG: 5 TABLET ORAL at 16:45

## 2017-03-08 RX ADMIN — INSULIN LISPRO 2 UNITS: 100 INJECTION, SOLUTION INTRAVENOUS; SUBCUTANEOUS at 16:46

## 2017-03-08 RX ADMIN — HEPARIN SODIUM 5000 UNITS: 5000 INJECTION, SOLUTION INTRAVENOUS; SUBCUTANEOUS at 22:01

## 2017-03-08 RX ADMIN — Medication 2 TABLET: at 08:29

## 2017-03-08 RX ADMIN — OXYCODONE HYDROCHLORIDE 10 MG: 10 TABLET ORAL at 01:24

## 2017-03-08 RX ADMIN — Medication 2 TABLET: at 22:01

## 2017-03-08 ASSESSMENT — ENCOUNTER SYMPTOMS
STRIDOR: 0
FEVER: 0
TREMORS: 0
WHEEZING: 0
PHOTOPHOBIA: 0
HEARTBURN: 0
TINGLING: 0
NAUSEA: 0
DOUBLE VISION: 0
BLURRED VISION: 0
SHORTNESS OF BREATH: 0
FOCAL WEAKNESS: 0
DIARRHEA: 0
INSOMNIA: 0
DIZZINESS: 0
MEMORY LOSS: 0
VOMITING: 0
COUGH: 0
MYALGIAS: 1
SORE THROAT: 0
CHILLS: 0
DEPRESSION: 0
PALPITATIONS: 0
HEADACHES: 0
ABDOMINAL PAIN: 0

## 2017-03-08 ASSESSMENT — PAIN SCALES - GENERAL
PAINLEVEL_OUTOF10: 3
PAINLEVEL_OUTOF10: 2
PAINLEVEL_OUTOF10: 7
PAINLEVEL_OUTOF10: 7
PAINLEVEL_OUTOF10: 6
PAINLEVEL_OUTOF10: ASSUMED PAIN PRESENT

## 2017-03-08 ASSESSMENT — GAIT ASSESSMENTS
DISTANCE (FEET): 3
GAIT LEVEL OF ASSIST: CONTACT GUARD ASSIST
ASSISTIVE DEVICE: FRONT WHEEL WALKER

## 2017-03-08 NOTE — PROGRESS NOTES
2123: Patient continuing to complain of dizziness. Vital signs are stable at this moment and blood sugar is 201. Patient educated that it could be caused by her pain medication. Family at bedside wonder if it could be caused by her not wearing her glasses (which she wears everyday). Patient denies nausea/vomiting/passing gas but reports burping, no BM since 3/4/2017. Patient turns independently in bed. Wearing humidified oxygen mask at this time.

## 2017-03-08 NOTE — PROGRESS NOTES
Pt resting comfortably in bed at this time. Her daughter is at her bedside to assist with translation. Denies any current needs. Discussed pain medication regimen as well as supplement delivery on trays. Dressing to right upper leg C/D/I. Oxymask with O2 at 3 L and humidified air in place. Will monitor.

## 2017-03-08 NOTE — CARE PLAN
Problem: Pain Management  Goal: Pain level will decrease to patient’s comfort goal  Outcome: PROGRESSING AS EXPECTED  Current pain regimen keeping pt comfortable. Denies need for alterations.

## 2017-03-08 NOTE — PROGRESS NOTES
Dressing changed to incision. Repositioned in bed. Encouraged pt. Get up to void but daughter states she is too weak. Pt. Is using bedpan to void.

## 2017-03-08 NOTE — PROGRESS NOTES
71yoF with right IT femur fx s/p IMN 3/4.    S: Daughter at bedside.  Persistent dizziness is still biggest complaint.  Pain seems to be improved some.      O:  Filed Vitals:    03/08/17 1105 03/08/17 1112 03/08/17 1119 03/08/17 1130   BP: 151/70 158/80 162/65 162/65   Pulse:       Temp:       Resp:    18   Height:       Weight:       SpO2:    97%     Exam:  General-NAD, alert and following commands  RLE-dressing c/d/i, +EHL/FHL/TA/GS motor, SILT in foot and palp dp pulse    Hct: 24.4    A: 71yoF with right IT femur fx s/p IMN 3/4.  Pain improved.  Acute blood loss anemia with likely underlying chronic anemia improved and stable posttransfusion.  Persistent dizziness, normal orthostatics.  Had echo.      PLAN:  --WBAT RLE  --continue PT/OT   --okay to restart heparin from my perspective, continue SCDs  --consider further workup for dizziness as this seems to be her biggest functional limitation and complaint at this point  --will need SNF placement  --fu 2 weeks postop

## 2017-03-08 NOTE — PROGRESS NOTES
Hospital Medicine Progress Note, Adult, Complex               Author: John Estrada Date & Time created: 3/7/2017  11:18 PM     CC: 71 y.o female with hx of HTN admitted for a mechanical ground level fall and sustained  intertrochanteric fracture    Interval History:    S/p ORIF, patient is having right hip discomfort which impairs her movement, in addition sleepy overall and not motivated to participate with PT. Discussed situation with patient's daughter, I tried motiviating her and she said she is willing to push herself.  Pain control  PT/OT  H/H stable after transfusion. Monitor.        Review of Systems:  Review of Systems   Constitutional: Negative for fever and chills.   HENT: Negative for congestion, hearing loss, sore throat and tinnitus.    Eyes: Negative for blurred vision, double vision and photophobia.   Respiratory: Negative for cough, shortness of breath, wheezing and stridor.    Cardiovascular: Negative for chest pain and palpitations.   Gastrointestinal: Negative for heartburn, nausea, vomiting, abdominal pain and diarrhea.   Genitourinary: Negative for dysuria.   Musculoskeletal: Positive for myalgias and joint pain (Right hip pain).   Skin: Negative.    Neurological: Negative for dizziness, tingling, tremors, focal weakness and headaches.   Psychiatric/Behavioral: Negative for depression, suicidal ideas and memory loss. The patient does not have insomnia.        Physical Exam:  Physical Exam   Constitutional: She is oriented to person, place, and time. She appears well-developed and well-nourished. No distress.   Tired      HENT:   Head: Normocephalic and atraumatic.   Right Ear: External ear normal.   Left Ear: External ear normal.   Eyes: EOM are normal. Right eye exhibits no discharge. Left eye exhibits no discharge.   Neck: Neck supple. No JVD present. No thyromegaly present.   Cardiovascular: Normal rate, regular rhythm and normal heart sounds.    Pulmonary/Chest: Effort normal and  breath sounds normal. No stridor. No respiratory distress. She exhibits no tenderness.   Abdominal: Soft. Bowel sounds are normal. She exhibits no distension. There is no tenderness. There is no rebound and no guarding.   Musculoskeletal: Normal range of motion. She exhibits tenderness (right hip area). She exhibits no edema.   Neurological: She is alert and oriented to person, place, and time. No cranial nerve deficit.   Skin: Skin is warm and dry. She is not diaphoretic. No erythema.   Psychiatric: She has a normal mood and affect. Her behavior is normal. Judgment and thought content normal.   Nursing note and vitals reviewed.      Labs:        Invalid input(s): [id number]      Recent Labs      17   [id number]  17   0050   SODIUM  140  136  134*   POTASSIUM  4.4  4.0  3.7   CHLORIDE  112  108  105   CO2  24  24  24   BUN  20  15  16   CREATININE  0.91  0.67  0.70   MAGNESIUM  2.1   --    --    CALCIUM  8.2*  7.7*  7.7*     Recent Labs      17   [id number]  17   0050   GLUCOSE  144*  122*  157*     Recent Labs      17   1612  17   0050   RBC  3.25*  3.96*  3.65*   HEMOGLOBIN  6.7*  8.7*  8.1*   HEMATOCRIT  21.6*  28.1*  25.7*   PLATELETCT  186  179  182     Recent Labs      17   1612  17   0050   WBC  8.4  8.7  9.0   NEUTSPOLYS   --   70.00   --    LYMPHOCYTES   --   17.40*   --    MONOCYTES   --   9.60   --    EOSINOPHILS   --   1.80   --    BASOPHILS   --   0.30   --            Hemodynamics:  Temp (24hrs), Av.7 °C (98.1 °F), Min:36.4 °C (97.5 °F), Max:37.2 °C (98.9 °F)  Temperature: 36.4 °C (97.5 °F)  Pulse  Av.5  Min: 62  Max: 84   Blood Pressure : 139/71 mmHg     Respiratory:    Respiration: 18, Pulse Oximetry: 98 %        RUL Breath Sounds: Clear, RML Breath Sounds: Clear, RLL Breath Sounds: Clear, TRUDI Breath Sounds: Clear, LLL Breath Sounds: Clear  Fluids:  No intake or output data in the 24  hours ending 03/07/17 5758     GI/Nutrition:  Orders Placed This Encounter   Procedures   • DIET ORDER     Standing Status: Standing      Number of Occurrences: 1      Standing Expiration Date:      Order Specific Question:  Diet:     Answer:  Diabetic [3]     Medical Decision Making, by Problem:  Active Hospital Problems    Diagnosis   • Femoral neck fracture (CMS-HCC) [S72.009A]  - s/p IM nail of right hip after sustaining a displaced intertrochanteric femur fracture  - WBAT  - Pain control  - PT/OT and placement.     • Microcytic anemia, likely chronic disease  - Acute on chronic 2/2 to post operative blood loss  - H/H 8.1/25.7, cont to monitor.  - Continue Ferrous sulfate tabs.     • DM type 2 (diabetes mellitus, type 2) (CMS-HCC) [E11.9]  -  Continue Insulin-sliding scale, accu-checks and hypoglycemia protocol.         Syncope  - Echocardiogram essentially normal.  - patient still having mild dizziness when getting up.  - check orthostatics.    Patient plan of care discussed at multidisplinary team rounds and with patient and R.N at beside.     Labs reviewed and Medications reviewed  Pearson catheter: No Pearson      DVT Prophylaxis: Heparin    Ulcer prophylaxis: Yes    Assessed for rehab: Patient was assess for and/or received rehabilitation services during this hospitalization

## 2017-03-08 NOTE — DIETARY
Nutrition Services: Brief Update  Repeat consult for poor PO and FTT.  RD initially assessed patient 3/6 - please see dietary note for more details.   I visited with patient and daughter this afternoon who report that patients appetite continues to be low.  Her intake has been variable the last few days with % of meals consumed on a diabetic diet.  Lunch at bedside ~ 1/3 consumed.  Boost is provided TID between meals and patients drinks some of this but becomes too full to finish them.  Last BM noted 3/4/17 - I discussed this with AMANUEL Dasilva who reports that she took two stool softeners this morning and medical team is on board.    Plan/Recommend:  Encourage PO with patient.  Nutrition rep to see patient daily for meal and snack preferences.  Initiate bowel protocol as medically feasible.    RD monitoring

## 2017-03-08 NOTE — PROGRESS NOTES
Hospital Medicine Progress Note, Adult, Complex               Author: John Estrada Date & Time created: 3/8/2017  2:00 PM     CC: 71 y.o female with hx of HTN admitted for a mechanical ground level fall and sustained  intertrochanteric fracture    Interval History:    S/p ORIF, patient is having right hip discomfort which impairs her movement, in addition sleepy overall and not motivated to participate with PT. Discussed situation with patient's daughter, I tried motiviating her and she said she is willing to push herself.  Pain control  PT/OT  H/H stable after transfusion. Monitor.    3/8  No issues overnight, patient still feels dizzy, and tired appearing. Patient denies fevers/chills, chest pain, shortness of breath or nausea/vommiting.   PT/OT evaluated, most likely needs rehab.  Perform orthostatics, sit up in chair.   Pending insurance.    3/9  Patient feeling better overall she was able to tolerate 1hr of sitting up in the chair. Dizziness is near resolved, but still feels weak. Patient denies fevers/chills, chest pain, shortness of breath or nausea/vommiting.         Review of Systems:grossly unchanged  Review of Systems   Constitutional: Positive for malaise/fatigue. Negative for fever and chills.   HENT: Negative for congestion, hearing loss, sore throat and tinnitus.    Eyes: Negative for blurred vision, double vision and photophobia.   Respiratory: Negative for cough, shortness of breath, wheezing and stridor.    Cardiovascular: Negative for chest pain and palpitations.   Gastrointestinal: Negative for heartburn, nausea, vomiting, abdominal pain and diarrhea.   Genitourinary: Negative for dysuria.   Musculoskeletal: Positive for myalgias and joint pain (Right hip pain).   Skin: Negative.    Neurological: Positive for weakness. Negative for dizziness, tingling, tremors, focal weakness and headaches.   Psychiatric/Behavioral: Negative for depression, suicidal ideas and memory loss. The patient does  not have insomnia.        Physical Exam:grossly unchanged.  Physical Exam   Constitutional: She is oriented to person, place, and time. She appears well-developed and well-nourished. No distress.   Tired appearing     HENT:   Head: Normocephalic and atraumatic.   Right Ear: External ear normal.   Left Ear: External ear normal.   Eyes: Pupils are equal, round, and reactive to light. Right eye exhibits no discharge. Left eye exhibits no discharge.   Neck: Neck supple. No JVD present. No thyromegaly present.   Cardiovascular: Normal rate, regular rhythm and normal heart sounds.    Pulmonary/Chest: Effort normal and breath sounds normal. No stridor. No respiratory distress. She exhibits no tenderness.   Abdominal: Soft. Bowel sounds are normal. She exhibits no distension. There is no tenderness. There is no rebound and no guarding.   Musculoskeletal: Normal range of motion. She exhibits tenderness (right hip area improving). She exhibits no edema.   Neurological: She is alert and oriented to person, place, and time. No cranial nerve deficit.   Skin: Skin is warm and dry. She is not diaphoretic. No erythema.   Psychiatric: She has a normal mood and affect. Her behavior is normal. Judgment and thought content normal.   Nursing note and vitals reviewed.      Labs:        Invalid input(s): [id number]      Recent Labs      03/06/17 0325  03/07/17   0050   SODIUM  136  134*   POTASSIUM  4.0  3.7   CHLORIDE  108  105   CO2  24  24   BUN  15  16   CREATININE  0.67  0.70   CALCIUM  7.7*  7.7*     Recent Labs      03/06/17   0325  03/07/17   0050   GLUCOSE  122*  157*     Recent Labs      03/06/17   1612  03/07/17   0050  03/08/17   0259   RBC  3.96*  3.65*  3.51*   HEMOGLOBIN  8.7*  8.1*  7.8*   HEMATOCRIT  28.1*  25.7*  24.4*   PLATELETCT  179  182  191     Recent Labs      03/06/17   1612  03/07/17   0050  03/08/17   0259   WBC  8.7  9.0  7.8   NEUTSPOLYS  70.00   --    --    LYMPHOCYTES  17.40*   --    --    MONOCYTES   9.60   --    --    EOSINOPHILS  1.80   --    --    BASOPHILS  0.30   --    --            Hemodynamics:  Temp (24hrs), Av.8 °C (98.3 °F), Min:36.4 °C (97.5 °F), Max:37.7 °C (99.8 °F)  Temperature: 37.7 °C (99.8 °F)  Pulse  Av.4  Min: 62  Max: 84Heart Rate (Monitored): 84  Blood Pressure : (!) 162/65 mmHg     Respiratory:    Respiration: 18, Pulse Oximetry: 97 %        RUL Breath Sounds: Clear, RML Breath Sounds: Clear, RLL Breath Sounds: Clear, TRUDI Breath Sounds: Clear, LLL Breath Sounds: Clear  Fluids:  No intake or output data in the 24 hours ending 17 1400     GI/Nutrition:  Orders Placed This Encounter   Procedures   • DIET ORDER     Standing Status: Standing      Number of Occurrences: 1      Standing Expiration Date:      Order Specific Question:  Diet:     Answer:  Diabetic [3]     Medical Decision Making, by Problem:  Active Hospital Problems    Diagnosis   • Femoral neck fracture (CMS-HCC) [S72.009A]  - s/p IM nail of right hip after sustaining a displaced intertrochanteric femur fracture  - WBAT  - Pain control  - PT/OT and placement.     • Microcytic anemia, likely chronic disease  - Acute on chronic 2/2 to post operative blood loss  - H/H 7.6/23.3/ 7.8/24.4< 8.1/25.7, cont to monitor.  - Continue Ferrous sulfate tabs.   - will transfuse 1U PRBCs given feeling weak and mildly dizzy.      • DM type 2 (diabetes mellitus, type 2) (CMS-HCC) [E11.9]  -  Continue Insulin-sliding scale, accu-checks and hypoglycemia protocol.         Syncope  - Echocardiogram essentially normal.  - patient still having mild dizziness but was up in a chair last night and was doing well.   - no issues no episodes of syncope .    Mild hyponatremia  - resolved.    Hypokalemia  - replenish lytes, check bmp in the am.      Patient plan of care discussed at multidisplinary team rounds and with patient and R.N at beside.     Labs reviewed and Medications reviewed  Pearson catheter: No Pearson      DVT Prophylaxis:  Heparin    Ulcer prophylaxis: Yes    Assessed for rehab: Patient was assess for and/or received rehabilitation services during this hospitalization

## 2017-03-08 NOTE — CARE PLAN
Problem: Bowel/Gastric:  Goal: Normal bowel function is maintained or improved  Outcome: PROGRESSING SLOWER THAN EXPECTED

## 2017-03-09 LAB
ALBUMIN SERPL BCP-MCNC: 2.8 G/DL (ref 3.2–4.9)
ALBUMIN/GLOB SERPL: 0.9 G/DL
ALP SERPL-CCNC: 47 U/L (ref 30–99)
ALT SERPL-CCNC: 14 U/L (ref 2–50)
ANION GAP SERPL CALC-SCNC: 5 MMOL/L (ref 0–11.9)
AST SERPL-CCNC: 36 U/L (ref 12–45)
BASOPHILS # BLD AUTO: 0.7 % (ref 0–1.8)
BASOPHILS # BLD: 0.05 K/UL (ref 0–0.12)
BILIRUB SERPL-MCNC: 0.8 MG/DL (ref 0.1–1.5)
BUN SERPL-MCNC: 13 MG/DL (ref 8–22)
CALCIUM SERPL-MCNC: 8.5 MG/DL (ref 8.5–10.5)
CHLORIDE SERPL-SCNC: 103 MMOL/L (ref 96–112)
CO2 SERPL-SCNC: 28 MMOL/L (ref 20–33)
CREAT SERPL-MCNC: 0.6 MG/DL (ref 0.5–1.4)
EOSINOPHIL # BLD AUTO: 0.29 K/UL (ref 0–0.51)
EOSINOPHIL NFR BLD: 3.9 % (ref 0–6.9)
ERYTHROCYTE [DISTWIDTH] IN BLOOD BY AUTOMATED COUNT: 44.5 FL (ref 35.9–50)
GFR SERPL CREATININE-BSD FRML MDRD: >60 ML/MIN/1.73 M 2
GLOBULIN SER CALC-MCNC: 3.2 G/DL (ref 1.9–3.5)
GLUCOSE BLD-MCNC: 140 MG/DL (ref 65–99)
GLUCOSE BLD-MCNC: 177 MG/DL (ref 65–99)
GLUCOSE BLD-MCNC: 185 MG/DL (ref 65–99)
GLUCOSE BLD-MCNC: 185 MG/DL (ref 65–99)
GLUCOSE SERPL-MCNC: 128 MG/DL (ref 65–99)
HCT VFR BLD AUTO: 23.3 % (ref 37–47)
HGB BLD-MCNC: 7.6 G/DL (ref 12–16)
IMM GRANULOCYTES # BLD AUTO: 0.05 K/UL (ref 0–0.11)
IMM GRANULOCYTES NFR BLD AUTO: 0.7 % (ref 0–0.9)
LYMPHOCYTES # BLD AUTO: 1.65 K/UL (ref 1–4.8)
LYMPHOCYTES NFR BLD: 22 % (ref 22–41)
MAGNESIUM SERPL-MCNC: 1.8 MG/DL (ref 1.5–2.5)
MCH RBC QN AUTO: 22.4 PG (ref 27–33)
MCHC RBC AUTO-ENTMCNC: 32.6 G/DL (ref 33.6–35)
MCV RBC AUTO: 68.7 FL (ref 81.4–97.8)
MONOCYTES # BLD AUTO: 0.87 K/UL (ref 0–0.85)
MONOCYTES NFR BLD AUTO: 11.6 % (ref 0–13.4)
NEUTROPHILS # BLD AUTO: 4.58 K/UL (ref 2–7.15)
NEUTROPHILS NFR BLD: 61.1 % (ref 44–72)
NRBC # BLD AUTO: 0.04 K/UL
NRBC BLD AUTO-RTO: 0.5 /100 WBC
PLATELET # BLD AUTO: 208 K/UL (ref 164–446)
PMV BLD AUTO: 9.6 FL (ref 9–12.9)
POTASSIUM SERPL-SCNC: 3.4 MMOL/L (ref 3.6–5.5)
PROT SERPL-MCNC: 6 G/DL (ref 6–8.2)
RBC # BLD AUTO: 3.39 M/UL (ref 4.2–5.4)
SODIUM SERPL-SCNC: 136 MMOL/L (ref 135–145)
WBC # BLD AUTO: 7.5 K/UL (ref 4.8–10.8)

## 2017-03-09 PROCEDURE — 302128 INFUSION PUMP: Performed by: HOSPITALIST

## 2017-03-09 PROCEDURE — A9270 NON-COVERED ITEM OR SERVICE: HCPCS | Performed by: INTERNAL MEDICINE

## 2017-03-09 PROCEDURE — 700105 HCHG RX REV CODE 258

## 2017-03-09 PROCEDURE — 36430 TRANSFUSION BLD/BLD COMPNT: CPT

## 2017-03-09 PROCEDURE — 700111 HCHG RX REV CODE 636 W/ 250 OVERRIDE (IP): Performed by: HOSPITALIST

## 2017-03-09 PROCEDURE — 700111 HCHG RX REV CODE 636 W/ 250 OVERRIDE (IP): Performed by: ORTHOPAEDIC SURGERY

## 2017-03-09 PROCEDURE — P9016 RBC LEUKOCYTES REDUCED: HCPCS

## 2017-03-09 PROCEDURE — 36415 COLL VENOUS BLD VENIPUNCTURE: CPT

## 2017-03-09 PROCEDURE — 700102 HCHG RX REV CODE 250 W/ 637 OVERRIDE(OP): Performed by: ORTHOPAEDIC SURGERY

## 2017-03-09 PROCEDURE — 770006 HCHG ROOM/CARE - MED/SURG/GYN SEMI*

## 2017-03-09 PROCEDURE — 99232 SBSQ HOSP IP/OBS MODERATE 35: CPT | Performed by: HOSPITALIST

## 2017-03-09 PROCEDURE — A9270 NON-COVERED ITEM OR SERVICE: HCPCS | Performed by: ORTHOPAEDIC SURGERY

## 2017-03-09 PROCEDURE — 86923 COMPATIBILITY TEST ELECTRIC: CPT

## 2017-03-09 PROCEDURE — 700102 HCHG RX REV CODE 250 W/ 637 OVERRIDE(OP): Performed by: HOSPITALIST

## 2017-03-09 PROCEDURE — 80053 COMPREHEN METABOLIC PANEL: CPT

## 2017-03-09 PROCEDURE — 85025 COMPLETE CBC W/AUTO DIFF WBC: CPT

## 2017-03-09 PROCEDURE — 700102 HCHG RX REV CODE 250 W/ 637 OVERRIDE(OP): Performed by: INTERNAL MEDICINE

## 2017-03-09 PROCEDURE — 83735 ASSAY OF MAGNESIUM: CPT

## 2017-03-09 PROCEDURE — 82962 GLUCOSE BLOOD TEST: CPT

## 2017-03-09 PROCEDURE — A9270 NON-COVERED ITEM OR SERVICE: HCPCS | Performed by: HOSPITALIST

## 2017-03-09 RX ORDER — SODIUM CHLORIDE 9 MG/ML
INJECTION, SOLUTION INTRAVENOUS
Status: COMPLETED
Start: 2017-03-09 | End: 2017-03-09

## 2017-03-09 RX ORDER — POTASSIUM CHLORIDE 20 MEQ/1
40 TABLET, EXTENDED RELEASE ORAL ONCE
Status: COMPLETED | OUTPATIENT
Start: 2017-03-09 | End: 2017-03-09

## 2017-03-09 RX ORDER — MAGNESIUM SULFATE HEPTAHYDRATE 40 MG/ML
2 INJECTION, SOLUTION INTRAVENOUS ONCE
Status: COMPLETED | OUTPATIENT
Start: 2017-03-09 | End: 2017-03-09

## 2017-03-09 RX ADMIN — Medication 2 TABLET: at 08:16

## 2017-03-09 RX ADMIN — NICOTINE 14 MG: 14 PATCH TRANSDERMAL at 06:28

## 2017-03-09 RX ADMIN — MAGNESIUM SULFATE IN WATER 2 G: 40 INJECTION, SOLUTION INTRAVENOUS at 09:23

## 2017-03-09 RX ADMIN — POTASSIUM CHLORIDE 40 MEQ: 1500 TABLET, EXTENDED RELEASE ORAL at 08:16

## 2017-03-09 RX ADMIN — OXYCODONE HYDROCHLORIDE 10 MG: 10 TABLET ORAL at 21:02

## 2017-03-09 RX ADMIN — INSULIN LISPRO 1 UNITS: 100 INJECTION, SOLUTION INTRAVENOUS; SUBCUTANEOUS at 16:23

## 2017-03-09 RX ADMIN — INSULIN LISPRO 1 UNITS: 100 INJECTION, SOLUTION INTRAVENOUS; SUBCUTANEOUS at 10:36

## 2017-03-09 RX ADMIN — OXYCODONE HYDROCHLORIDE 5 MG: 5 TABLET ORAL at 06:32

## 2017-03-09 RX ADMIN — SODIUM CHLORIDE 500 ML: 9 INJECTION, SOLUTION INTRAVENOUS at 14:25

## 2017-03-09 RX ADMIN — OXYCODONE HYDROCHLORIDE 5 MG: 5 TABLET ORAL at 16:20

## 2017-03-09 RX ADMIN — HEPARIN SODIUM 5000 UNITS: 5000 INJECTION, SOLUTION INTRAVENOUS; SUBCUTANEOUS at 06:26

## 2017-03-09 RX ADMIN — FERROUS GLUCONATE 324 MG: 324 TABLET ORAL at 08:16

## 2017-03-09 RX ADMIN — HEPARIN SODIUM 5000 UNITS: 5000 INJECTION, SOLUTION INTRAVENOUS; SUBCUTANEOUS at 13:50

## 2017-03-09 RX ADMIN — HEPARIN SODIUM 5000 UNITS: 5000 INJECTION, SOLUTION INTRAVENOUS; SUBCUTANEOUS at 21:01

## 2017-03-09 RX ADMIN — OXYCODONE HYDROCHLORIDE 5 MG: 5 TABLET ORAL at 10:33

## 2017-03-09 RX ADMIN — INSULIN LISPRO 1 UNITS: 100 INJECTION, SOLUTION INTRAVENOUS; SUBCUTANEOUS at 20:58

## 2017-03-09 ASSESSMENT — PAIN SCALES - GENERAL
PAINLEVEL_OUTOF10: 5

## 2017-03-09 ASSESSMENT — ENCOUNTER SYMPTOMS: WEAKNESS: 1

## 2017-03-09 ASSESSMENT — LIFESTYLE VARIABLES: DO YOU DRINK ALCOHOL: NO

## 2017-03-09 NOTE — PROGRESS NOTES
Assumed care of patient at 1915 on 3/8, patient oriented X4, cooperative, painful in right hip, medicated with prn oxycodone, daughter in room, assists with transfer to [facility], patient ambulated with FWW...serge...yusuf moe

## 2017-03-09 NOTE — PROGRESS NOTES
Pt able to ambulate to bathroom with 1 person assist and front wheeled walker. Tolerated bathing in the bathroom without issue. Up to chair at this time eating dinner, daughter at bedside. Medicated per PRN MAR for pain. SpO2 93% on room air following ambulation, remains off nasal cannula at this time. She is also reporting that her dizziness has subsided for the time being. Will continue to monitor.

## 2017-03-10 ENCOUNTER — PATIENT OUTREACH (OUTPATIENT)
Dept: HEALTH INFORMATION MANAGEMENT | Facility: OTHER | Age: 72
End: 2017-03-10

## 2017-03-10 PROBLEM — E87.1 HYPONATREMIA: Status: ACTIVE | Noted: 2017-03-10

## 2017-03-10 PROBLEM — D64.9 ANEMIA: Status: ACTIVE | Noted: 2017-03-10

## 2017-03-10 PROBLEM — R55 SYNCOPE: Status: ACTIVE | Noted: 2017-03-10

## 2017-03-10 PROBLEM — R94.31 PROLONGED QT INTERVAL: Status: ACTIVE | Noted: 2017-03-10

## 2017-03-10 PROBLEM — E87.6 HYPOKALEMIA: Status: ACTIVE | Noted: 2017-03-10

## 2017-03-10 LAB
GLUCOSE BLD-MCNC: 117 MG/DL (ref 65–99)
GLUCOSE BLD-MCNC: 166 MG/DL (ref 65–99)
GLUCOSE BLD-MCNC: 168 MG/DL (ref 65–99)
GLUCOSE BLD-MCNC: 178 MG/DL (ref 65–99)

## 2017-03-10 PROCEDURE — A9270 NON-COVERED ITEM OR SERVICE: HCPCS | Performed by: INTERNAL MEDICINE

## 2017-03-10 PROCEDURE — 700102 HCHG RX REV CODE 250 W/ 637 OVERRIDE(OP): Performed by: HOSPITALIST

## 2017-03-10 PROCEDURE — 770006 HCHG ROOM/CARE - MED/SURG/GYN SEMI*

## 2017-03-10 PROCEDURE — 90471 IMMUNIZATION ADMIN: CPT

## 2017-03-10 PROCEDURE — A9270 NON-COVERED ITEM OR SERVICE: HCPCS | Performed by: HOSPITALIST

## 2017-03-10 PROCEDURE — 97110 THERAPEUTIC EXERCISES: CPT

## 2017-03-10 PROCEDURE — 99232 SBSQ HOSP IP/OBS MODERATE 35: CPT | Performed by: HOSPITALIST

## 2017-03-10 PROCEDURE — 700111 HCHG RX REV CODE 636 W/ 250 OVERRIDE (IP): Performed by: HOSPITALIST

## 2017-03-10 PROCEDURE — 700102 HCHG RX REV CODE 250 W/ 637 OVERRIDE(OP): Performed by: INTERNAL MEDICINE

## 2017-03-10 PROCEDURE — 90662 IIV NO PRSV INCREASED AG IM: CPT | Performed by: HOSPITALIST

## 2017-03-10 PROCEDURE — 82962 GLUCOSE BLOOD TEST: CPT | Mod: 91

## 2017-03-10 PROCEDURE — A9270 NON-COVERED ITEM OR SERVICE: HCPCS | Performed by: ORTHOPAEDIC SURGERY

## 2017-03-10 PROCEDURE — 700102 HCHG RX REV CODE 250 W/ 637 OVERRIDE(OP): Performed by: ORTHOPAEDIC SURGERY

## 2017-03-10 PROCEDURE — 3E0234Z INTRODUCTION OF SERUM, TOXOID AND VACCINE INTO MUSCLE, PERCUTANEOUS APPROACH: ICD-10-PCS | Performed by: INTERNAL MEDICINE

## 2017-03-10 PROCEDURE — 97530 THERAPEUTIC ACTIVITIES: CPT

## 2017-03-10 PROCEDURE — 700111 HCHG RX REV CODE 636 W/ 250 OVERRIDE (IP): Performed by: ORTHOPAEDIC SURGERY

## 2017-03-10 PROCEDURE — 97116 GAIT TRAINING THERAPY: CPT

## 2017-03-10 RX ORDER — OXYCODONE HYDROCHLORIDE 5 MG/1
5 TABLET ORAL ONCE
Status: DISPENSED | OUTPATIENT
Start: 2017-03-10 | End: 2017-03-11

## 2017-03-10 RX ORDER — POTASSIUM CHLORIDE 20 MEQ/1
60 TABLET, EXTENDED RELEASE ORAL ONCE
Status: COMPLETED | OUTPATIENT
Start: 2017-03-10 | End: 2017-03-10

## 2017-03-10 RX ORDER — DEXTROSE MONOHYDRATE 25 G/50ML
25 INJECTION, SOLUTION INTRAVENOUS
Status: DISCONTINUED | OUTPATIENT
Start: 2017-03-10 | End: 2017-03-15 | Stop reason: HOSPADM

## 2017-03-10 RX ADMIN — INSULIN LISPRO 1 UNITS: 100 INJECTION, SOLUTION INTRAVENOUS; SUBCUTANEOUS at 11:48

## 2017-03-10 RX ADMIN — OXYCODONE HYDROCHLORIDE 10 MG: 10 TABLET ORAL at 11:45

## 2017-03-10 RX ADMIN — INSULIN LISPRO 1 UNITS: 100 INJECTION, SOLUTION INTRAVENOUS; SUBCUTANEOUS at 17:43

## 2017-03-10 RX ADMIN — OXYCODONE HYDROCHLORIDE 10 MG: 10 TABLET ORAL at 21:40

## 2017-03-10 RX ADMIN — NICOTINE 14 MG: 14 PATCH TRANSDERMAL at 06:00

## 2017-03-10 RX ADMIN — OXYCODONE HYDROCHLORIDE 5 MG: 5 TABLET ORAL at 02:33

## 2017-03-10 RX ADMIN — HEPARIN SODIUM 5000 UNITS: 5000 INJECTION, SOLUTION INTRAVENOUS; SUBCUTANEOUS at 06:00

## 2017-03-10 RX ADMIN — OXYCODONE HYDROCHLORIDE 5 MG: 5 TABLET ORAL at 06:31

## 2017-03-10 RX ADMIN — INFLUENZA A VIRUS A/CALIFORNIA/7/2009 X-179A (H1N1) ANTIGEN (FORMALDEHYDE INACTIVATED), INFLUENZA A VIRUS A/HONG KONG/4801/2014 X-263B (H3N2) ANTIGEN (FORMALDEHYDE INACTIVATED), AND INFLUENZA B VIRUS B/BRISBANE/60/2008 ANTIGEN (FORMALDEHYDE INACTIVATED) 0.5 ML: 60; 60; 60 INJECTION, SUSPENSION INTRAMUSCULAR at 11:46

## 2017-03-10 RX ADMIN — ACETAMINOPHEN 650 MG: 325 TABLET, FILM COATED ORAL at 10:10

## 2017-03-10 RX ADMIN — POTASSIUM CHLORIDE 60 MEQ: 1500 TABLET, EXTENDED RELEASE ORAL at 10:09

## 2017-03-10 RX ADMIN — INSULIN LISPRO 1 UNITS: 100 INJECTION, SOLUTION INTRAVENOUS; SUBCUTANEOUS at 21:44

## 2017-03-10 RX ADMIN — FERROUS GLUCONATE 324 MG: 324 TABLET ORAL at 10:09

## 2017-03-10 RX ADMIN — OXYCODONE HYDROCHLORIDE 10 MG: 10 TABLET ORAL at 17:39

## 2017-03-10 RX ADMIN — HEPARIN SODIUM 5000 UNITS: 5000 INJECTION, SOLUTION INTRAVENOUS; SUBCUTANEOUS at 14:29

## 2017-03-10 RX ADMIN — HEPARIN SODIUM 5000 UNITS: 5000 INJECTION, SOLUTION INTRAVENOUS; SUBCUTANEOUS at 21:40

## 2017-03-10 ASSESSMENT — ENCOUNTER SYMPTOMS
DIZZINESS: 0
SHORTNESS OF BREATH: 0
MYALGIAS: 1
HEARTBURN: 0
MEMORY LOSS: 0
COUGH: 0
PHOTOPHOBIA: 0
STRIDOR: 0
BLURRED VISION: 0
WHEEZING: 0
FOCAL WEAKNESS: 0
DIARRHEA: 0
PALPITATIONS: 0
TINGLING: 0
CHILLS: 0
ABDOMINAL PAIN: 0
NAUSEA: 0
DEPRESSION: 0
INSOMNIA: 0
WEAKNESS: 1
FEVER: 0
TREMORS: 0
DOUBLE VISION: 0
SORE THROAT: 0
VOMITING: 0
HEADACHES: 0

## 2017-03-10 ASSESSMENT — GAIT ASSESSMENTS
ASSISTIVE DEVICE: FRONT WHEEL WALKER
DEVIATION: ANTALGIC;STEP TO;BRADYKINETIC;SHUFFLED GAIT
GAIT LEVEL OF ASSIST: CONTACT GUARD ASSIST
DISTANCE (FEET): 80

## 2017-03-10 ASSESSMENT — PAIN SCALES - GENERAL
PAINLEVEL_OUTOF10: 5
PAINLEVEL_OUTOF10: ASSUMED PAIN PRESENT
PAINLEVEL_OUTOF10: 4

## 2017-03-10 NOTE — PROGRESS NOTES
Pt appears well tonight, she c/o some bottom numbness and frequent stools. I withheld her stool softener and will report the numbness to day shift. Her daughter helps with her care and does a good job of it. Overal pt appeared well tonight.

## 2017-03-10 NOTE — PROGRESS NOTES
71yoF with right IT femur fx s/p IMN 3/4.    S: Daughter at bedside, tearful that her mom can't go to stiff.  Says she is mobilizing better but having some urinary incontinence.    O:  Filed Vitals:    03/09/17 2000 03/10/17 0000 03/10/17 0400 03/10/17 0800   BP: 167/68 153/65 140/66 148/68   Pulse: 71 68 65 66   Temp: 36.8 °C (98.3 °F) 37.1 °C (98.7 °F) 36.5 °C (97.7 °F) 37.4 °C (99.4 °F)   Resp: 16 16 12 16   Height:       Weight:       SpO2: 99% 94% 99% 99%     Exam:  Not examined, in bathroom.    Hct: 24.4    A: 71yoF with right IT femur fx s/p IMN 3/4.  Pain improved.  Acute blood loss anemia with likely underlying chronic anemia improved and stable posttransfusion.  Persistent dizziness, normal orthostatics.  Had echo.      PLAN:  --WBAT RLE  --continue PT/OT   --okay to restart heparin from my perspective, continue SCDs  --okay to discharge when appropriately and independently mobilizing with PT/OT  --staples out 2 weeks postop

## 2017-03-10 NOTE — PROGRESS NOTES
Received bedside shift report from night RN. Pt AOx4.  Pt reports pain is 4/10. Does call appropriately. Bed alarm is off. Pt is sitting up in the chair after using the commode. PIV assessed and is patent. Pt is on RA. POC discussed as well as unit routine, comfort, and safety. Dicussed DC planning to home. Discussed the goal for today mobility and strength. Reviewed orders, notes, labs, and test results. Hourly rounding in place with RN rounding on odd hours and CNA on even hours.

## 2017-03-10 NOTE — PROGRESS NOTES
"   Orthopaedic Progress Note    Interval changes:  Patient doing well  Slow to mobilize   SNF placement pending     ROS - Patient denies any new issues.  Pain well controlled.    Blood pressure 146/72, pulse 73, temperature 36.5 °C (97.7 °F), resp. rate 20, height 1.473 m (4' 10\"), weight 40.824 kg (90 lb), SpO2 100 %, not currently breastfeeding.      Patient seen and examined  No acute distress  Breathing non labored  RRR  Right hip dressings are clean, dry, and intact. Patient clearly fires tibialis anterior, EHL, and gastrocnemius/soleus. Sensation is intact to light touch throughout superficial peroneal, deep peroneal, tibial, saphenous, and sural nerve distributions. Strong and palpable 2+ dorsalis pedis and posterior tibial pulses with capillary refill less than 2 seconds. No lower leg tenderness or discomfort.       Recent Labs      03/07/17   0050  03/08/17   0259  03/09/17   0349   WBC  9.0  7.8  7.5   RBC  3.65*  3.51*  3.39*   HEMOGLOBIN  8.1*  7.8*  7.6*   HEMATOCRIT  25.7*  24.4*  23.3*   MCV  70.4*  69.5*  68.7*   MCH  22.2*  22.2*  22.4*   MCHC  31.5*  32.0*  32.6*   RDW  44.3  43.8  44.5   PLATELETCT  182  191  208   MPV  10.2  10.3  9.6       Active Hospital Problems    Diagnosis   • Femoral neck fracture (CMS-HCC) [S72.009A]   • DM type 2 (diabetes mellitus, type 2) (CMS-HCC) [E11.9]       Assessment/Plan:  Patient doing well  POD#5 S/P Open treatment with intramedullary nailing, right displaced intertrochanteric femur fracture  Wt bearing status - WBAT  Wound care/Drains - dressings CDI  Future Procedures - none planned  Sutures/Staples out- 10-14 days post operatively  PT/OT-initiated  Antibiotics: completed  DVT Prophylaxis- TEDS/SCDs/Foot pumps/heparin  Pearson-none  Case Coordination for Discharge Planning - Disposition SNF     "

## 2017-03-10 NOTE — PROGRESS NOTES
Hospital Medicine Progress Note, Adult, Complex               Author: Blanca Broussard Date & Time created: 3/10/2017  8:12 AM     CC: 71 y.o female with hx of HTN admitted for a mechanical ground level fall and sustained  intertrochanteric fracture    Interval History:    3/8  No issues overnight, patient still feels dizzy, and tired appearing. Patient denies fevers/chills, chest pain, shortness of breath or nausea/vommiting.   PT/OT evaluated, most likely needs rehab.  Perform orthostatics, sit up in chair.   Pending insurance.    3/9  Patient feeling better overall she was able to tolerate 1hr of sitting up in the chair. Dizziness is near resolved, but still feels weak. Patient denies fevers/chills, chest pain, shortness of breath or nausea/vommiting.     3/10  Pain 5/10 right hip going down.  No nausea currently.  Sitting up in chair.     Consults:  Ortho- [facility]    Review of Systems:grossly unchanged  Review of Systems   Constitutional: Positive for malaise/fatigue. Negative for fever and chills.   HENT: Negative for congestion, hearing loss, sore throat and tinnitus.    Eyes: Negative for blurred vision, double vision and photophobia.   Respiratory: Negative for cough, shortness of breath, wheezing and stridor.    Cardiovascular: Negative for chest pain and palpitations.   Gastrointestinal: Negative for heartburn, nausea, vomiting, abdominal pain and diarrhea.   Genitourinary: Negative for dysuria.   Musculoskeletal: Positive for myalgias and joint pain (Right hip pain).   Skin: Negative.    Neurological: Positive for weakness. Negative for dizziness, tingling, tremors, focal weakness and headaches.   Psychiatric/Behavioral: Negative for depression, suicidal ideas and memory loss. The patient does not have insomnia.        Physical Exam:grossly unchanged.  Physical Exam   Constitutional: She is oriented to person, place, and time. She appears well-developed and well-nourished. No distress.   Tired  appearing     HENT:   Head: Normocephalic and atraumatic.   Right Ear: External ear normal.   Left Ear: External ear normal.   Eyes: Pupils are equal, round, and reactive to light. Right eye exhibits no discharge. Left eye exhibits no discharge.   Neck: Neck supple. No JVD present. No thyromegaly present.   Cardiovascular: Normal rate, regular rhythm and normal heart sounds.    Pulmonary/Chest: Effort normal and breath sounds normal. No stridor. No respiratory distress. She exhibits no tenderness.   Abdominal: Soft. Bowel sounds are normal. She exhibits no distension. There is no tenderness. There is no rebound and no guarding.   Musculoskeletal: Normal range of motion. She exhibits tenderness (right hip area improving). She exhibits no edema.   Limited range of motion right leg due to pain     Neurological: She is alert and oriented to person, place, and time. No cranial nerve deficit.   Skin: Skin is warm and dry. She is not diaphoretic. No erythema.   Psychiatric: She has a normal mood and affect. Her behavior is normal. Judgment and thought content normal.   Nursing note and vitals reviewed.      Labs:        Invalid input(s): [id number]      Recent Labs      17   034   SODIUM  136   POTASSIUM  3.4*   CHLORIDE  103   CO2  28   BUN  13   CREATININE  0.60   MAGNESIUM  1.8   CALCIUM  8.5     Recent Labs      17   ALTSGPT  14   ASTSGOT  36   ALKPHOSPHAT  47   TBILIRUBIN  0.8   GLUCOSE  128*     Recent Labs      17   RBC  3.51*  3.39*   HEMOGLOBIN  7.8*  7.6*   HEMATOCRIT  24.4*  23.3*   PLATELETCT  191  208     Recent Labs      17   WBC  7.8  7.5   NEUTSPOLYS   --   61.10   LYMPHOCYTES   --   22.00   MONOCYTES   --   11.60   EOSINOPHILS   --   3.90   BASOPHILS   --   0.70   ASTSGOT   --   36   ALTSGPT   --   14   ALKPHOSPHAT   --   47   TBILIRUBIN   --   0.8           Hemodynamics:  Temp (24hrs), Av.7 °C (98.1 °F), Min:36.5 °C  (97.7 °F), Max:37.1 °C (98.8 °F)  Temperature: 36.5 °C (97.7 °F)  Pulse  Av.4  Min: 61  Max: 93   Blood Pressure : 140/66 mmHg     Respiratory:    Respiration: 12, Pulse Oximetry: 99 %        RUL Breath Sounds: Clear, RML Breath Sounds: Clear, RLL Breath Sounds: Diminished, TRUDI Breath Sounds: Clear, LLL Breath Sounds: Diminished  Fluids:    Intake/Output Summary (Last 24 hours) at 03/10/17 0812  Last data filed at 17 1800   Gross per 24 hour   Intake    300 ml   Output      0 ml   Net    300 ml        GI/Nutrition:  Orders Placed This Encounter   Procedures   • DIET ORDER     Standing Status: Standing      Number of Occurrences: 1      Standing Expiration Date:      Order Specific Question:  Diet:     Answer:  Diabetic [3]     Medical Decision Making, by Problem:  Active Hospital Problems    Diagnosis   • Femoral neck fracture (CMS-HCC) [S72.009A]  - s/p IM nail of right hip after sustaining a displaced intertrochanteric femur fracture  - WBAT RLE  - Pain control  - PT/OT  -placement is an issue as patient is visiting the  and has no source of insurance however patient not safe for discharge home as of yet per PT. Discussed with daughter patient will need to be able to have 24 hour care at home.  -Dx:Right femoral neck fracture  -ortho following  -Sutures/Staples out- 10-14 days post operatively- 3/14-3/18. [facility] office will contact patient to set up appointment, schedulers have called and spoken to them.    • Microcytic anemia, likely chronic disease  - Acute on chronic 2/2 to post operative blood loss  -  cont to monitor.  - Continue Ferrous sulfate tabs.    • DM type 2 (diabetes mellitus, type 2) (CMS-HCC) [E11.9]  -  Continue Insulin-sliding scale, accu-checks and hypoglycemia protocol.  - BS goal inpatient < 180  -metformin held     *Near Syncope  - Echocardiogram essentially normal.  -Carotid duplex:Antegrade flow, bilateral vertebral arteries.    Flow within both subclavian arteries appears  to be within normal limits.    Mild bilateral cervical internal carotid artery stenosis (<50%).   - no issues no episodes of syncope .    *Mild hyponatremia  - resolved.    *Hypokalemia  - replenish lytes, check bmp in the am.    *Prolonged qt  -monitor electrolytes and medications    Patient plan of care discussed at multidisplinary team rounds and with patient and R.N at beside.  Full code     Labs reviewed, Medications reviewed and Radiology images reviewed  Pearson catheter: No Pearson      DVT Prophylaxis: Heparin    Ulcer prophylaxis: Not indicated

## 2017-03-11 PROBLEM — J95.89 ACUTE RESPIRATORY INSUFFICIENCY, POSTOPERATIVE: Status: ACTIVE | Noted: 2017-03-11

## 2017-03-11 LAB
ANION GAP SERPL CALC-SCNC: 5 MMOL/L (ref 0–11.9)
BASOPHILS # BLD AUTO: 0.8 % (ref 0–1.8)
BASOPHILS # BLD: 0.06 K/UL (ref 0–0.12)
BUN SERPL-MCNC: 12 MG/DL (ref 8–22)
CALCIUM SERPL-MCNC: 8.9 MG/DL (ref 8.5–10.5)
CHLORIDE SERPL-SCNC: 104 MMOL/L (ref 96–112)
CO2 SERPL-SCNC: 30 MMOL/L (ref 20–33)
CREAT SERPL-MCNC: 0.52 MG/DL (ref 0.5–1.4)
EOSINOPHIL # BLD AUTO: 0.47 K/UL (ref 0–0.51)
EOSINOPHIL NFR BLD: 6.6 % (ref 0–6.9)
ERYTHROCYTE [DISTWIDTH] IN BLOOD BY AUTOMATED COUNT: 53.1 FL (ref 35.9–50)
GFR SERPL CREATININE-BSD FRML MDRD: >60 ML/MIN/1.73 M 2
GLUCOSE BLD-MCNC: 122 MG/DL (ref 65–99)
GLUCOSE BLD-MCNC: 170 MG/DL (ref 65–99)
GLUCOSE BLD-MCNC: 206 MG/DL (ref 65–99)
GLUCOSE SERPL-MCNC: 118 MG/DL (ref 65–99)
HCT VFR BLD AUTO: 31.4 % (ref 37–47)
HGB BLD-MCNC: 10.1 G/DL (ref 12–16)
IMM GRANULOCYTES # BLD AUTO: 0.07 K/UL (ref 0–0.11)
IMM GRANULOCYTES NFR BLD AUTO: 1 % (ref 0–0.9)
LYMPHOCYTES # BLD AUTO: 1.45 K/UL (ref 1–4.8)
LYMPHOCYTES NFR BLD: 20.3 % (ref 22–41)
MCH RBC QN AUTO: 23.5 PG (ref 27–33)
MCHC RBC AUTO-ENTMCNC: 32.2 G/DL (ref 33.6–35)
MCV RBC AUTO: 73.2 FL (ref 81.4–97.8)
MONOCYTES # BLD AUTO: 0.76 K/UL (ref 0–0.85)
MONOCYTES NFR BLD AUTO: 10.7 % (ref 0–13.4)
NEUTROPHILS # BLD AUTO: 4.32 K/UL (ref 2–7.15)
NEUTROPHILS NFR BLD: 60.6 % (ref 44–72)
NRBC # BLD AUTO: 0.03 K/UL
NRBC BLD AUTO-RTO: 0.4 /100 WBC
PLATELET # BLD AUTO: 297 K/UL (ref 164–446)
PMV BLD AUTO: 9.8 FL (ref 9–12.9)
POTASSIUM SERPL-SCNC: 4.6 MMOL/L (ref 3.6–5.5)
RBC # BLD AUTO: 4.29 M/UL (ref 4.2–5.4)
SODIUM SERPL-SCNC: 139 MMOL/L (ref 135–145)
WBC # BLD AUTO: 7.1 K/UL (ref 4.8–10.8)

## 2017-03-11 PROCEDURE — 99232 SBSQ HOSP IP/OBS MODERATE 35: CPT | Performed by: HOSPITALIST

## 2017-03-11 PROCEDURE — 700111 HCHG RX REV CODE 636 W/ 250 OVERRIDE (IP): Performed by: ORTHOPAEDIC SURGERY

## 2017-03-11 PROCEDURE — 82962 GLUCOSE BLOOD TEST: CPT

## 2017-03-11 PROCEDURE — A9270 NON-COVERED ITEM OR SERVICE: HCPCS | Performed by: INTERNAL MEDICINE

## 2017-03-11 PROCEDURE — 80048 BASIC METABOLIC PNL TOTAL CA: CPT

## 2017-03-11 PROCEDURE — 700102 HCHG RX REV CODE 250 W/ 637 OVERRIDE(OP): Performed by: ORTHOPAEDIC SURGERY

## 2017-03-11 PROCEDURE — 700102 HCHG RX REV CODE 250 W/ 637 OVERRIDE(OP): Performed by: INTERNAL MEDICINE

## 2017-03-11 PROCEDURE — 700102 HCHG RX REV CODE 250 W/ 637 OVERRIDE(OP): Performed by: HOSPITALIST

## 2017-03-11 PROCEDURE — 770006 HCHG ROOM/CARE - MED/SURG/GYN SEMI*

## 2017-03-11 PROCEDURE — A9270 NON-COVERED ITEM OR SERVICE: HCPCS | Performed by: ORTHOPAEDIC SURGERY

## 2017-03-11 PROCEDURE — 85025 COMPLETE CBC W/AUTO DIFF WBC: CPT

## 2017-03-11 PROCEDURE — 36415 COLL VENOUS BLD VENIPUNCTURE: CPT

## 2017-03-11 PROCEDURE — A9270 NON-COVERED ITEM OR SERVICE: HCPCS | Performed by: HOSPITALIST

## 2017-03-11 RX ORDER — LABETALOL HYDROCHLORIDE 5 MG/ML
10 INJECTION, SOLUTION INTRAVENOUS EVERY 4 HOURS PRN
Status: DISCONTINUED | OUTPATIENT
Start: 2017-03-11 | End: 2017-03-15 | Stop reason: HOSPADM

## 2017-03-11 RX ADMIN — HEPARIN SODIUM 5000 UNITS: 5000 INJECTION, SOLUTION INTRAVENOUS; SUBCUTANEOUS at 06:00

## 2017-03-11 RX ADMIN — INSULIN LISPRO 2 UNITS: 100 INJECTION, SOLUTION INTRAVENOUS; SUBCUTANEOUS at 11:36

## 2017-03-11 RX ADMIN — OXYCODONE HYDROCHLORIDE 10 MG: 10 TABLET ORAL at 22:10

## 2017-03-11 RX ADMIN — HEPARIN SODIUM 5000 UNITS: 5000 INJECTION, SOLUTION INTRAVENOUS; SUBCUTANEOUS at 22:01

## 2017-03-11 RX ADMIN — HEPARIN SODIUM 5000 UNITS: 5000 INJECTION, SOLUTION INTRAVENOUS; SUBCUTANEOUS at 14:45

## 2017-03-11 RX ADMIN — INSULIN LISPRO 1 UNITS: 100 INJECTION, SOLUTION INTRAVENOUS; SUBCUTANEOUS at 17:00

## 2017-03-11 RX ADMIN — NICOTINE 14 MG: 14 PATCH TRANSDERMAL at 06:24

## 2017-03-11 RX ADMIN — Medication 2 TABLET: at 22:02

## 2017-03-11 RX ADMIN — OXYCODONE HYDROCHLORIDE 5 MG: 5 TABLET ORAL at 06:24

## 2017-03-11 RX ADMIN — Medication 2 TABLET: at 08:25

## 2017-03-11 RX ADMIN — OXYCODONE HYDROCHLORIDE 5 MG: 5 TABLET ORAL at 18:37

## 2017-03-11 RX ADMIN — OXYCODONE HYDROCHLORIDE 5 MG: 5 TABLET ORAL at 14:45

## 2017-03-11 RX ADMIN — INSULIN LISPRO 1 UNITS: 100 INJECTION, SOLUTION INTRAVENOUS; SUBCUTANEOUS at 22:01

## 2017-03-11 RX ADMIN — FERROUS GLUCONATE 324 MG: 324 TABLET ORAL at 08:25

## 2017-03-11 RX ADMIN — OXYCODONE HYDROCHLORIDE 5 MG: 5 TABLET ORAL at 10:31

## 2017-03-11 RX ADMIN — OXYCODONE HYDROCHLORIDE 5 MG: 5 TABLET ORAL at 02:16

## 2017-03-11 ASSESSMENT — ENCOUNTER SYMPTOMS
INSOMNIA: 0
SORE THROAT: 0
DIZZINESS: 0
PHOTOPHOBIA: 0
SHORTNESS OF BREATH: 0
WHEEZING: 0
HEADACHES: 0
ABDOMINAL PAIN: 0
HEARTBURN: 0
FEVER: 0
FOCAL WEAKNESS: 0
BLURRED VISION: 0
MEMORY LOSS: 0
DEPRESSION: 0
TREMORS: 0
STRIDOR: 0
WEAKNESS: 1
COUGH: 0
DOUBLE VISION: 0
NAUSEA: 0
PALPITATIONS: 0
DIARRHEA: 0
VOMITING: 0
TINGLING: 0
CHILLS: 0

## 2017-03-11 ASSESSMENT — PAIN SCALES - GENERAL
PAINLEVEL_OUTOF10: 2
PAINLEVEL_OUTOF10: 5
PAINLEVEL_OUTOF10: 4
PAINLEVEL_OUTOF10: 10
PAINLEVEL_OUTOF10: 5
PAINLEVEL_OUTOF10: 2
PAINLEVEL_OUTOF10: ASSUMED PAIN PRESENT

## 2017-03-11 NOTE — CARE PLAN
Problem: Bowel/Gastric:  Goal: Will not experience complications related to bowel motility  Outcome: PROGRESSING AS EXPECTED

## 2017-03-11 NOTE — PROGRESS NOTES
Hospital Medicine Progress Note, Adult, Complex               Author: Blanca Hemalbruno Date & Time created: 3/11/2017  8:15 AM     CC: 71 y.o female with hx of HTN admitted for a mechanical ground level fall and sustained  intertrochanteric fracture    Interval History:    3/8  No issues overnight, patient still feels dizzy, and tired appearing. Patient denies fevers/chills, chest pain, shortness of breath or nausea/vommiting.   PT/OT evaluated, most likely needs rehab.  Perform orthostatics, sit up in chair.   Pending insurance.    3/9  Patient feeling better overall she was able to tolerate 1hr of sitting up in the chair. Dizziness is near resolved, but still feels weak. Patient denies fevers/chills, chest pain, shortness of breath or nausea/vommiting.     3/10  Pain 5/10 right hip going down.  No nausea currently.  Sitting up in chair.     3/11  Pt was able to walk out into the hallways today and to the restroom many times per RN. Contact assist and FWW per RN. Pain 5/10.      Consults:  Ortho- [place]    Review of Systems:grossly unchanged  Review of Systems   Constitutional: Positive for malaise/fatigue. Negative for fever and chills.   HENT: Negative for congestion, hearing loss, sore throat and tinnitus.    Eyes: Negative for blurred vision, double vision and photophobia.   Respiratory: Negative for cough, shortness of breath, wheezing and stridor.    Cardiovascular: Negative for chest pain and palpitations.   Gastrointestinal: Negative for heartburn, nausea, vomiting, abdominal pain and diarrhea.   Genitourinary: Negative for dysuria.   Musculoskeletal: Positive for joint pain (Right hip pain).   Skin: Negative.    Neurological: Positive for weakness. Negative for dizziness, tingling, tremors, focal weakness and headaches.   Psychiatric/Behavioral: Negative for depression, suicidal ideas and memory loss. The patient does not have insomnia.        Physical Exam:grossly unchanged.  Physical Exam    Constitutional: She is oriented to person, place, and time. She appears well-developed and well-nourished. No distress.   Tired appearing     HENT:   Head: Normocephalic and atraumatic.   Right Ear: External ear normal.   Left Ear: External ear normal.   Eyes: Pupils are equal, round, and reactive to light. Right eye exhibits no discharge. Left eye exhibits no discharge.   Neck: Neck supple. No JVD present. No thyromegaly present.   Cardiovascular: Normal rate, regular rhythm and normal heart sounds.    Pulmonary/Chest: Effort normal and breath sounds normal. No stridor. No respiratory distress. She exhibits no tenderness.   Abdominal: Soft. Bowel sounds are normal. She exhibits no distension. There is no tenderness. There is no rebound and no guarding.   Musculoskeletal: Normal range of motion. She exhibits tenderness (right hip area improving). She exhibits no edema.   Limited range of motion right leg due to pain     Neurological: She is alert and oriented to person, place, and time. No cranial nerve deficit.   Skin: Skin is warm and dry. She is not diaphoretic. No erythema.   Psychiatric: She has a normal mood and affect. Her behavior is normal. Judgment and thought content normal.   Nursing note and vitals reviewed.      Labs:        Invalid input(s): [id number]      Recent Labs      03/09/17 0349 03/11/17 0412   SODIUM  136  139   POTASSIUM  3.4*  4.6   CHLORIDE  103  104   CO2  28  30   BUN  13  12   CREATININE  0.60  0.52   MAGNESIUM  1.8   --    CALCIUM  8.5  8.9     Recent Labs      03/09/17 0349 03/11/17 0412   ALTSGPT  14   --    ASTSGOT  36   --    ALKPHOSPHAT  47   --    TBILIRUBIN  0.8   --    GLUCOSE  128*  118*     Recent Labs      03/09/17 0349 03/11/17 0412   RBC  3.39*  4.29   HEMOGLOBIN  7.6*  10.1*   HEMATOCRIT  23.3*  31.4*   PLATELETCT  208  297     Recent Labs      03/09/17 0349 03/11/17 0412   WBC  7.5  7.1   NEUTSPOLYS  61.10  60.60   LYMPHOCYTES  22.00  20.30*    MONOCYTES  11.60  10.70   EOSINOPHILS  3.90  6.60   BASOPHILS  0.70  0.80   ASTSGOT  36   --    ALTSGPT  14   --    ALKPHOSPHAT  47   --    TBILIRUBIN  0.8   --            Hemodynamics:  Temp (24hrs), Av.6 °C (97.8 °F), Min:36.1 °C (96.9 °F), Max:37.4 °C (99.4 °F)  Temperature: 37.4 °C (99.4 °F)  Pulse  Av.9  Min: 61  Max: 93   Blood Pressure : 135/64 mmHg     Respiratory:    Respiration: 18, Pulse Oximetry: 91 %        RUL Breath Sounds: Clear, RML Breath Sounds: Clear, RLL Breath Sounds: Diminished, TRUDI Breath Sounds: Clear, LLL Breath Sounds: Diminished  Fluids:  No intake or output data in the 24 hours ending 17 0815     GI/Nutrition:  Orders Placed This Encounter   Procedures   • DIET ORDER     Standing Status: Standing      Number of Occurrences: 1      Standing Expiration Date:      Order Specific Question:  Diet:     Answer:  Diabetic [3]     Medical Decision Making, by Problem:  Active Hospital Problems    Diagnosis   • Femoral neck fracture (CMS-HCC) [S72.009A]  - s/p IM nail of right hip after sustaining a displaced intertrochanteric femur fracture  - WBAT RLE  - Pain control  - PT/OT  -placement is an issue as patient is visiting the  and has no source of insurance however patient not safe for discharge home as of yet per PT. Discussed with daughter patient will need to be able to have 24 hour care at home.  -Dx:Right femoral neck fracture  -ortho following  -Sutures/Staples out- 10-14 days post operatively- 3/14-3/18. [facility] office will contact patient to set up appointment, schedulers have called and spoken to them.    • Microcytic anemia, likely chronic disease  - Acute on chronic 2/2 to post operative blood loss  -  cont to monitor.  - Continue Ferrous sulfate tabs.    • DM type 2 (diabetes mellitus, type 2) (CMS-HCC) [E11.9]  -  Continue Insulin-sliding scale, accu-checks and hypoglycemia protocol.  - BS goal inpatient < 180  -metformin held     *Near Syncope  -  Echocardiogram essentially normal.  -Carotid duplex:Antegrade flow, bilateral vertebral arteries.    Flow within both subclavian arteries appears to be within normal limits.    Mild bilateral cervical internal carotid artery stenosis (<50%).   - no issues no episodes of syncope .    *Mild hyponatremia  - resolved.    *Hypokalemia  -resolved    *Prolonged qt  -monitor electrolytes and medications    *Acute hypoxic respiratory insufficiency, postoperative  -likely due to post operative atelectasis  -lungs clear on exam  -continue IS, wean off oxygen    Patient plan of care discussed at multidisplinary team rounds and with patient and R.N at beside.  Full code     Labs reviewed, Medications reviewed and Radiology images reviewed  Pearson catheter: No Pearson      DVT Prophylaxis: Heparin    Ulcer prophylaxis: Not indicated

## 2017-03-11 NOTE — PROGRESS NOTES
"   Orthopaedic Progress Note    Interval changes:  Dressings CDI  Pending SNF placment     ROS - Patient denies any new issues.  Pain well controlled.    Blood pressure 161/73, pulse 78, temperature 36.9 °C (98.4 °F), resp. rate 18, height 1.473 m (4' 10\"), weight 40.824 kg (90 lb), SpO2 94 %, not currently breastfeeding.      Patient seen and examined  No acute distress  Breathing non labored  RRR  Right hip dressings are clean, dry, and intact. Patient clearly fires tibialis anterior, EHL, and gastrocnemius/soleus. Sensation is intact to light touch throughout superficial peroneal, deep peroneal, tibial, saphenous, and sural nerve distributions. Strong and palpable 2+ dorsalis pedis and posterior tibial pulses with capillary refill less than 2 seconds. No lower leg tenderness or discomfort.       Recent Labs      03/09/17   0349  03/11/17   0412   WBC  7.5  7.1   RBC  3.39*  4.29   HEMOGLOBIN  7.6*  10.1*   HEMATOCRIT  23.3*  31.4*   MCV  68.7*  73.2*   MCH  22.4*  23.5*   MCHC  32.6*  32.2*   RDW  44.5  53.1*   PLATELETCT  208  297   MPV  9.6  9.8       Active Hospital Problems    Diagnosis   • Syncope [R55]   • Anemia [D64.9]   • Hyponatremia [E87.1]   • Prolonged QT interval [R94.31]   • Hypokalemia [E87.6]   • Femoral neck fracture (CMS-HCC) [S72.009A]   • DM type 2 (diabetes mellitus, type 2) (CMS-HCC) [E11.9]       Assessment/Plan:  Patient doing well  Cleared for DC by ortho for SNF placement pending medicine clearance  POD#7 S/P Open treatment with intramedullary nailing, right displaced intertrochanteric femur fracture  Wt bearing status - WBAT  Wound care/Drains - dressings CDI  Future Procedures - none planned  Sutures/Staples out- 10-14 days post operatively  PT/OT-initiated  Antibiotics: completed  DVT Prophylaxis- TEDS/SCDs/Foot pumps/heparin  Pearson-none  Case Coordination for Discharge Planning - Disposition SNF     "

## 2017-03-11 NOTE — CARE PLAN
Problem: Pain Management  Goal: Pain level will decrease to patient’s comfort goal  Outcome: PROGRESSING AS EXPECTED  Pt pain is being manage with oxycodone 10mg. She was able to work with PT and tolerated it well      Problem: Mobility  Goal: Risk for activity intolerance will decrease  Outcome: PROGRESSING AS EXPECTED  Pt was able to walk out into the hallways today and to the restroom many times. Contact assist and FWW

## 2017-03-11 NOTE — CARE PLAN
Problem: Communication  Goal: The ability to communicate needs accurately and effectively will improve  Intervention: Use communication aids and/or /Language Line as appropriate  Family at bedside, able to assist with interpretation of questions and conversations with patient.       Problem: Pain Management  Goal: Pain level will decrease to patient’s comfort goal  Intervention: Follow pain managment plan developed in collaboration with patient and Interdisciplinary Team  Pt rating pain as a 5/10 this shift, report pain well controlled with PO pain medication Q4.

## 2017-03-11 NOTE — CARE PLAN
Problem: Nutritional:  Goal: Achieve adequate nutritional intake  Patient will consume 50% of meals   Outcome: PROGRESSING AS EXPECTED  Patient with another discipline at time of visit.  CNA reported daughter has been bringing food for patient.  Lunch tray was eaten at 100% today per CNA.  Nutrition Representative will continue to see her for ongoing meal and snack preferences.  RD following.

## 2017-03-12 PROBLEM — R35.0 URINARY FREQUENCY: Status: ACTIVE | Noted: 2017-03-12

## 2017-03-12 LAB
BASOPHILS # BLD AUTO: 0.8 % (ref 0–1.8)
BASOPHILS # BLD: 0.07 K/UL (ref 0–0.12)
EOSINOPHIL # BLD AUTO: 0.53 K/UL (ref 0–0.51)
EOSINOPHIL NFR BLD: 6.4 % (ref 0–6.9)
ERYTHROCYTE [DISTWIDTH] IN BLOOD BY AUTOMATED COUNT: 52 FL (ref 35.9–50)
GLUCOSE BLD-MCNC: 121 MG/DL (ref 65–99)
GLUCOSE BLD-MCNC: 155 MG/DL (ref 65–99)
GLUCOSE BLD-MCNC: 156 MG/DL (ref 65–99)
GLUCOSE BLD-MCNC: 183 MG/DL (ref 65–99)
GLUCOSE BLD-MCNC: 201 MG/DL (ref 65–99)
HCT VFR BLD AUTO: 30.8 % (ref 37–47)
HGB BLD-MCNC: 10 G/DL (ref 12–16)
IMM GRANULOCYTES # BLD AUTO: 0.1 K/UL (ref 0–0.11)
IMM GRANULOCYTES NFR BLD AUTO: 1.2 % (ref 0–0.9)
LYMPHOCYTES # BLD AUTO: 1.9 K/UL (ref 1–4.8)
LYMPHOCYTES NFR BLD: 22.9 % (ref 22–41)
MCH RBC QN AUTO: 23.5 PG (ref 27–33)
MCHC RBC AUTO-ENTMCNC: 32.5 G/DL (ref 33.6–35)
MCV RBC AUTO: 72.5 FL (ref 81.4–97.8)
MONOCYTES # BLD AUTO: 1.01 K/UL (ref 0–0.85)
MONOCYTES NFR BLD AUTO: 12.2 % (ref 0–13.4)
NEUTROPHILS # BLD AUTO: 4.7 K/UL (ref 2–7.15)
NEUTROPHILS NFR BLD: 56.5 % (ref 44–72)
NRBC # BLD AUTO: 0.02 K/UL
NRBC BLD AUTO-RTO: 0.2 /100 WBC
PLATELET # BLD AUTO: 339 K/UL (ref 164–446)
PMV BLD AUTO: 9.8 FL (ref 9–12.9)
RBC # BLD AUTO: 4.25 M/UL (ref 4.2–5.4)
WBC # BLD AUTO: 8.3 K/UL (ref 4.8–10.8)

## 2017-03-12 PROCEDURE — 770006 HCHG ROOM/CARE - MED/SURG/GYN SEMI*

## 2017-03-12 PROCEDURE — A9270 NON-COVERED ITEM OR SERVICE: HCPCS | Performed by: INTERNAL MEDICINE

## 2017-03-12 PROCEDURE — 99232 SBSQ HOSP IP/OBS MODERATE 35: CPT | Performed by: HOSPITALIST

## 2017-03-12 PROCEDURE — 700111 HCHG RX REV CODE 636 W/ 250 OVERRIDE (IP): Performed by: ORTHOPAEDIC SURGERY

## 2017-03-12 PROCEDURE — 85025 COMPLETE CBC W/AUTO DIFF WBC: CPT

## 2017-03-12 PROCEDURE — 700102 HCHG RX REV CODE 250 W/ 637 OVERRIDE(OP): Performed by: INTERNAL MEDICINE

## 2017-03-12 PROCEDURE — A9270 NON-COVERED ITEM OR SERVICE: HCPCS | Performed by: ORTHOPAEDIC SURGERY

## 2017-03-12 PROCEDURE — 700102 HCHG RX REV CODE 250 W/ 637 OVERRIDE(OP): Performed by: ORTHOPAEDIC SURGERY

## 2017-03-12 PROCEDURE — 700102 HCHG RX REV CODE 250 W/ 637 OVERRIDE(OP): Performed by: HOSPITALIST

## 2017-03-12 PROCEDURE — 36415 COLL VENOUS BLD VENIPUNCTURE: CPT

## 2017-03-12 PROCEDURE — A9270 NON-COVERED ITEM OR SERVICE: HCPCS | Performed by: HOSPITALIST

## 2017-03-12 PROCEDURE — 82962 GLUCOSE BLOOD TEST: CPT | Mod: 91

## 2017-03-12 RX ADMIN — NICOTINE 14 MG: 14 PATCH TRANSDERMAL at 06:59

## 2017-03-12 RX ADMIN — OXYCODONE HYDROCHLORIDE 5 MG: 5 TABLET ORAL at 06:55

## 2017-03-12 RX ADMIN — HEPARIN SODIUM 5000 UNITS: 5000 INJECTION, SOLUTION INTRAVENOUS; SUBCUTANEOUS at 06:59

## 2017-03-12 RX ADMIN — HEPARIN SODIUM 5000 UNITS: 5000 INJECTION, SOLUTION INTRAVENOUS; SUBCUTANEOUS at 13:25

## 2017-03-12 RX ADMIN — INSULIN LISPRO 1 UNITS: 100 INJECTION, SOLUTION INTRAVENOUS; SUBCUTANEOUS at 22:21

## 2017-03-12 RX ADMIN — OXYCODONE HYDROCHLORIDE 10 MG: 10 TABLET ORAL at 22:15

## 2017-03-12 RX ADMIN — OXYCODONE HYDROCHLORIDE 10 MG: 10 TABLET ORAL at 03:01

## 2017-03-12 RX ADMIN — HEPARIN SODIUM 5000 UNITS: 5000 INJECTION, SOLUTION INTRAVENOUS; SUBCUTANEOUS at 22:15

## 2017-03-12 RX ADMIN — FERROUS GLUCONATE 324 MG: 324 TABLET ORAL at 08:06

## 2017-03-12 RX ADMIN — INSULIN LISPRO 2 UNITS: 100 INJECTION, SOLUTION INTRAVENOUS; SUBCUTANEOUS at 11:48

## 2017-03-12 RX ADMIN — Medication 2 TABLET: at 08:06

## 2017-03-12 RX ADMIN — INSULIN LISPRO 1 UNITS: 100 INJECTION, SOLUTION INTRAVENOUS; SUBCUTANEOUS at 16:11

## 2017-03-12 RX ADMIN — OXYCODONE HYDROCHLORIDE 5 MG: 5 TABLET ORAL at 11:47

## 2017-03-12 RX ADMIN — OXYCODONE HYDROCHLORIDE 5 MG: 5 TABLET ORAL at 16:08

## 2017-03-12 RX ADMIN — Medication 2 TABLET: at 22:15

## 2017-03-12 ASSESSMENT — ENCOUNTER SYMPTOMS
CHILLS: 0
STRIDOR: 0
MEMORY LOSS: 0
INSOMNIA: 0
FOCAL WEAKNESS: 0
ABDOMINAL PAIN: 0
DOUBLE VISION: 0
DIARRHEA: 0
SORE THROAT: 0
NAUSEA: 0
BLURRED VISION: 0
HEADACHES: 0
COUGH: 0
PALPITATIONS: 0
DIZZINESS: 0
DEPRESSION: 0
SHORTNESS OF BREATH: 0
FEVER: 0
TREMORS: 0
VOMITING: 0
HEARTBURN: 0
PHOTOPHOBIA: 0
WHEEZING: 0
TINGLING: 0
WEAKNESS: 1

## 2017-03-12 ASSESSMENT — PAIN SCALES - GENERAL
PAINLEVEL_OUTOF10: 1
PAINLEVEL_OUTOF10: 2
PAINLEVEL_OUTOF10: 3
PAINLEVEL_OUTOF10: 9
PAINLEVEL_OUTOF10: 1
PAINLEVEL_OUTOF10: 3
PAINLEVEL_OUTOF10: 2

## 2017-03-12 ASSESSMENT — PATIENT HEALTH QUESTIONNAIRE - PHQ9
2. FEELING DOWN, DEPRESSED, IRRITABLE, OR HOPELESS: NOT AT ALL
SUM OF ALL RESPONSES TO PHQ QUESTIONS 1-9: 0
SUM OF ALL RESPONSES TO PHQ9 QUESTIONS 1 AND 2: 0
1. LITTLE INTEREST OR PLEASURE IN DOING THINGS: NOT AT ALL

## 2017-03-12 ASSESSMENT — LIFESTYLE VARIABLES: DO YOU DRINK ALCOHOL: NO

## 2017-03-12 NOTE — PROGRESS NOTES
"   Orthopaedic Progress Note    Interval changes:  Dressings changed since borders rolling back- incisions without complication  Pending SNF placment     ROS - Patient denies any new issues.  Pain well controlled.    Blood pressure 134/72, pulse 72, temperature 37 °C (98.6 °F), resp. rate 18, height 1.473 m (4' 10\"), weight 40.824 kg (90 lb), SpO2 92 %, not currently breastfeeding.      Patient seen and examined  No acute distress  Breathing non labored  RRR  Right hip dressings changed, surgical incisions are well approximated and are dry and clean.  There is no erythema, induration, or signs of infection at any of the incision sites.  Patient clearly fires tibialis anterior, EHL, and gastrocnemius/soleus. Sensation is intact to light touch throughout superficial peroneal, deep peroneal, tibial, saphenous, and sural nerve distributions. Strong and palpable 2+ dorsalis pedis and posterior tibial pulses with capillary refill less than 2 seconds. No lower leg tenderness or discomfort.       Recent Labs      03/11/17   0412  03/12/17   0300   WBC  7.1  8.3   RBC  4.29  4.25   HEMOGLOBIN  10.1*  10.0*   HEMATOCRIT  31.4*  30.8*   MCV  73.2*  72.5*   MCH  23.5*  23.5*   MCHC  32.2*  32.5*   RDW  53.1*  52.0*   PLATELETCT  297  339   MPV  9.8  9.8       Active Hospital Problems    Diagnosis   • Urinary frequency [R35.0]   • Acute respiratory insufficiency, postoperative (CMS-HCC) [J95.2]   • Syncope [R55]   • Anemia [D64.9]   • Hyponatremia [E87.1]   • Prolonged QT interval [R94.31]   • Hypokalemia [E87.6]   • Femoral neck fracture (CMS-HCC) [S72.009A]   • DM type 2 (diabetes mellitus, type 2) (CMS-HCC) [E11.9]       Assessment/Plan:  Patient doing well  Cleared for DC by ortho for SNF placement pending medicine clearance  POD#8 S/P Open treatment with intramedullary nailing, right displaced intertrochanteric femur fracture  Wt bearing status - WBAT  Wound care/Drains - dressings CDI  Future Procedures - none " planned  Sutures/Staples out- 10-14 days post operatively  PT/OT-initiated  Antibiotics: completed  DVT Prophylaxis- TEDS/SCDs/Foot pumps/heparin  Pearson-none  Case Coordination for Discharge Planning - Disposition SNF

## 2017-03-12 NOTE — PROGRESS NOTES
Hospital Medicine Progress Note, Adult, Complex               Author: Blanca Hemalbruno Date & Time created: 3/12/2017  7:53 AM     CC: 71 y.o female with hx of HTN admitted for a mechanical ground level fall and sustained  intertrochanteric fracture    Interval History:    3/8  No issues overnight, patient still feels dizzy, and tired appearing. Patient denies fevers/chills, chest pain, shortness of breath or nausea/vommiting.   PT/OT evaluated, most likely needs rehab.  Perform orthostatics, sit up in chair.   Pending insurance.    3/9  Patient feeling better overall she was able to tolerate 1hr of sitting up in the chair. Dizziness is near resolved, but still feels weak. Patient denies fevers/chills, chest pain, shortness of breath or nausea/vommiting.     3/10  Pain 5/10 right hip going down.  No nausea currently.  Sitting up in chair.     3/11  Pt was able to walk out into the hallways today and to the restroom many times per RN. Contact assist and FWW per RN. Pain 5/10.      3/12   No overnight events.      Consults:  Ortho- [place] [facility]    Review of Systems:grossly unchanged  Review of Systems   Constitutional: Positive for malaise/fatigue. Negative for fever and chills.   HENT: Negative for congestion, hearing loss, sore throat and tinnitus.    Eyes: Negative for blurred vision, double vision and photophobia.   Respiratory: Negative for cough, shortness of breath, wheezing and stridor.    Cardiovascular: Negative for chest pain and palpitations.   Gastrointestinal: Negative for heartburn, nausea, vomiting, abdominal pain and diarrhea.   Genitourinary: Negative for dysuria.   Musculoskeletal: Positive for joint pain (Right hip pain, 5/10).   Skin: Negative.    Neurological: Positive for weakness. Negative for dizziness, tingling, tremors, focal weakness and headaches.   Psychiatric/Behavioral: Negative for depression, suicidal ideas and memory loss. The patient does not have insomnia.        Physical Exam:grossly  unchanged.  Physical Exam   Constitutional: She is oriented to person, place, and time. She appears well-developed and well-nourished. No distress.   Tired appearing     HENT:   Head: Normocephalic and atraumatic.   Right Ear: External ear normal.   Left Ear: External ear normal.   Eyes: Pupils are equal, round, and reactive to light. Right eye exhibits no discharge. Left eye exhibits no discharge.   Neck: Neck supple. No JVD present. No thyromegaly present.   Cardiovascular: Normal rate, regular rhythm and normal heart sounds.    Pulmonary/Chest: Effort normal and breath sounds normal. No stridor. No respiratory distress. She exhibits no tenderness.   Abdominal: Soft. Bowel sounds are normal. She exhibits no distension. There is no tenderness. There is no rebound and no guarding.   Musculoskeletal: Normal range of motion. She exhibits tenderness (right hip area improving). She exhibits no edema.   Limited range of motion right leg due to pain     Neurological: She is alert and oriented to person, place, and time. No cranial nerve deficit.   Skin: Skin is warm and dry. She is not diaphoretic. No erythema.   Psychiatric: She has a normal mood and affect. Her behavior is normal. Judgment and thought content normal.   Nursing note and vitals reviewed.      Labs:        Invalid input(s): [id number]      Recent Labs      17   041   SODIUM  139   POTASSIUM  4.6   CHLORIDE  104   CO2  30   BUN  12   CREATININE  0.52   CALCIUM  8.9     Recent Labs      17   0412   GLUCOSE  118*     Recent Labs      17   0412  17   0300   RBC  4.29  4.25   HEMOGLOBIN  10.1*  10.0*   HEMATOCRIT  31.4*  30.8*   PLATELETCT  297  339     Recent Labs      172  17   0300   WBC  7.1  8.3   NEUTSPOLYS  60.60  56.50   LYMPHOCYTES  20.30*  22.90   MONOCYTES  10.70  12.20   EOSINOPHILS  6.60  6.40   BASOPHILS  0.80  0.80           Hemodynamics:  Temp (24hrs), Av.1 °C (98.8 °F), Min:36.8 °C (98.2 °F),  Max:37.6 °C (99.6 °F)  Temperature: 36.8 °C (98.2 °F)  Pulse  Av.1  Min: 61  Max: 93   Blood Pressure : 128/65 mmHg     Respiratory:    Respiration: 13, Pulse Oximetry: 98 %           Fluids:    Intake/Output Summary (Last 24 hours) at 17 0753  Last data filed at 17 1300   Gross per 24 hour   Intake    240 ml   Output      0 ml   Net    240 ml        GI/Nutrition:  Orders Placed This Encounter   Procedures   • DIET ORDER     Standing Status: Standing      Number of Occurrences: 1      Standing Expiration Date:      Order Specific Question:  Diet:     Answer:  Diabetic [3]     Medical Decision Making, by Problem:  Active Hospital Problems    Diagnosis   • Femoral neck fracture (CMS-HCC) [S72.009A]  - s/p IM nail of right hip after sustaining a displaced intertrochanteric femur fracture  - WBAT RLE  - Pain control  - PT/OT  -placement is an issue as patient is visiting the  and has no source of insurance however patient not safe for discharge home as of yet per PT. Discussed with daughter patient will need to be able to have 24 hour care at home.  -Dx:Right femoral neck fracture  -ortho following  -Sutures/Staples out- 10-14 days post operatively- 3/14-3/18. [facility] office will contact patient to set up appointment, schedulers have called and spoken to them.    • Microcytic anemia, likely chronic disease  - Acute on chronic 2/2 to post operative blood loss  -  cont to monitor.  - Continue Ferrous sulfate tabs.    • DM type 2 (diabetes mellitus, type 2) (CMS-HCC) [E11.9]  -  Continue Insulin-sliding scale, accu-checks and hypoglycemia protocol.  - BS goal inpatient < 180  -metformin held     *Near Syncope  - Echocardiogram essentially normal.  -Carotid duplex:Antegrade flow, bilateral vertebral arteries.    Flow within both subclavian arteries appears to be within normal limits.    Mild bilateral cervical internal carotid artery stenosis (<50%).   - no issues no episodes of syncope .    *Mild  hyponatremia  - resolved.    *Hypokalemia  -resolved    *Prolonged qt  -monitor electrolytes and medications    *Acute hypoxic respiratory insufficiency, postoperative; resolving  -likely due to post operative atelectasis  -lungs clear on exam  -continue IS, weaned off oxygen    *urinary frequency  -query UTI, pending UA    Patient plan of care discussed at multidisplinary team rounds and with patient and R.N at beside.  Full code     Labs reviewed, Medications reviewed and Radiology images reviewed  Pearson catheter: No Pearson      DVT Prophylaxis: Heparin    Ulcer prophylaxis: Not indicated

## 2017-03-13 ENCOUNTER — PATIENT OUTREACH (OUTPATIENT)
Dept: HEALTH INFORMATION MANAGEMENT | Facility: OTHER | Age: 72
End: 2017-03-13

## 2017-03-13 LAB
APPEARANCE UR: CLEAR
BILIRUB UR QL STRIP.AUTO: NEGATIVE
COLOR UR: YELLOW
GLUCOSE BLD-MCNC: 146 MG/DL (ref 65–99)
GLUCOSE BLD-MCNC: 169 MG/DL (ref 65–99)
GLUCOSE BLD-MCNC: 190 MG/DL (ref 65–99)
GLUCOSE BLD-MCNC: 193 MG/DL (ref 65–99)
GLUCOSE UR STRIP.AUTO-MCNC: NEGATIVE MG/DL
KETONES UR STRIP.AUTO-MCNC: NEGATIVE MG/DL
LEUKOCYTE ESTERASE UR QL STRIP.AUTO: NEGATIVE
MICRO URNS: NORMAL
NITRITE UR QL STRIP.AUTO: NEGATIVE
PH UR STRIP.AUTO: 6 [PH]
PROT UR QL STRIP: NEGATIVE MG/DL
RBC UR QL AUTO: NEGATIVE
SP GR UR STRIP.AUTO: 1.01

## 2017-03-13 PROCEDURE — 700102 HCHG RX REV CODE 250 W/ 637 OVERRIDE(OP): Performed by: INTERNAL MEDICINE

## 2017-03-13 PROCEDURE — A9270 NON-COVERED ITEM OR SERVICE: HCPCS | Performed by: INTERNAL MEDICINE

## 2017-03-13 PROCEDURE — 82962 GLUCOSE BLOOD TEST: CPT | Mod: 91

## 2017-03-13 PROCEDURE — 81003 URINALYSIS AUTO W/O SCOPE: CPT

## 2017-03-13 PROCEDURE — 770006 HCHG ROOM/CARE - MED/SURG/GYN SEMI*

## 2017-03-13 PROCEDURE — 700102 HCHG RX REV CODE 250 W/ 637 OVERRIDE(OP): Performed by: HOSPITALIST

## 2017-03-13 PROCEDURE — A9270 NON-COVERED ITEM OR SERVICE: HCPCS | Performed by: HOSPITALIST

## 2017-03-13 PROCEDURE — 700102 HCHG RX REV CODE 250 W/ 637 OVERRIDE(OP): Performed by: ORTHOPAEDIC SURGERY

## 2017-03-13 PROCEDURE — 99232 SBSQ HOSP IP/OBS MODERATE 35: CPT | Performed by: HOSPITALIST

## 2017-03-13 PROCEDURE — 97535 SELF CARE MNGMENT TRAINING: CPT

## 2017-03-13 PROCEDURE — A9270 NON-COVERED ITEM OR SERVICE: HCPCS | Performed by: ORTHOPAEDIC SURGERY

## 2017-03-13 PROCEDURE — 97116 GAIT TRAINING THERAPY: CPT

## 2017-03-13 PROCEDURE — 700111 HCHG RX REV CODE 636 W/ 250 OVERRIDE (IP): Performed by: ORTHOPAEDIC SURGERY

## 2017-03-13 RX ORDER — LISINOPRIL 10 MG/1
5 TABLET ORAL
Status: DISCONTINUED | OUTPATIENT
Start: 2017-03-13 | End: 2017-03-15 | Stop reason: HOSPADM

## 2017-03-13 RX ADMIN — OXYCODONE HYDROCHLORIDE 10 MG: 10 TABLET ORAL at 21:12

## 2017-03-13 RX ADMIN — INSULIN LISPRO 1 UNITS: 100 INJECTION, SOLUTION INTRAVENOUS; SUBCUTANEOUS at 21:21

## 2017-03-13 RX ADMIN — OXYCODONE HYDROCHLORIDE 10 MG: 10 TABLET ORAL at 02:46

## 2017-03-13 RX ADMIN — FERROUS GLUCONATE 324 MG: 324 TABLET ORAL at 09:27

## 2017-03-13 RX ADMIN — ACETAMINOPHEN 650 MG: 325 TABLET, FILM COATED ORAL at 09:27

## 2017-03-13 RX ADMIN — OXYCODONE HYDROCHLORIDE 5 MG: 5 TABLET ORAL at 12:07

## 2017-03-13 RX ADMIN — HEPARIN SODIUM 5000 UNITS: 5000 INJECTION, SOLUTION INTRAVENOUS; SUBCUTANEOUS at 06:03

## 2017-03-13 RX ADMIN — OXYCODONE HYDROCHLORIDE 10 MG: 10 TABLET ORAL at 06:42

## 2017-03-13 RX ADMIN — NICOTINE 14 MG: 14 PATCH TRANSDERMAL at 06:06

## 2017-03-13 RX ADMIN — INSULIN LISPRO 1 UNITS: 100 INJECTION, SOLUTION INTRAVENOUS; SUBCUTANEOUS at 17:16

## 2017-03-13 RX ADMIN — INSULIN LISPRO 1 UNITS: 100 INJECTION, SOLUTION INTRAVENOUS; SUBCUTANEOUS at 12:07

## 2017-03-13 RX ADMIN — Medication 2 TABLET: at 09:27

## 2017-03-13 RX ADMIN — HEPARIN SODIUM 5000 UNITS: 5000 INJECTION, SOLUTION INTRAVENOUS; SUBCUTANEOUS at 15:45

## 2017-03-13 RX ADMIN — OXYCODONE HYDROCHLORIDE 5 MG: 5 TABLET ORAL at 15:48

## 2017-03-13 RX ADMIN — LISINOPRIL 5 MG: 10 TABLET ORAL at 09:27

## 2017-03-13 RX ADMIN — HEPARIN SODIUM 5000 UNITS: 5000 INJECTION, SOLUTION INTRAVENOUS; SUBCUTANEOUS at 21:12

## 2017-03-13 ASSESSMENT — GAIT ASSESSMENTS
DISTANCE (FEET): 60
ASSISTIVE DEVICE: FRONT WHEEL WALKER
DEVIATION: ANTALGIC;DECREASED BASE OF SUPPORT;DECREASED TOE OFF
GAIT LEVEL OF ASSIST: STAND BY ASSIST

## 2017-03-13 ASSESSMENT — LIFESTYLE VARIABLES: DO YOU DRINK ALCOHOL: NO

## 2017-03-13 ASSESSMENT — ENCOUNTER SYMPTOMS
COUGH: 0
FOCAL WEAKNESS: 0
NAUSEA: 0
DIARRHEA: 0
ABDOMINAL PAIN: 0
INSOMNIA: 0
SHORTNESS OF BREATH: 0
DEPRESSION: 0
VOMITING: 0
CHILLS: 0
HEADACHES: 0
TREMORS: 0
PALPITATIONS: 0
STRIDOR: 0
WHEEZING: 0
FEVER: 0
DIZZINESS: 0
HEARTBURN: 0
SORE THROAT: 0
WEAKNESS: 1
TINGLING: 0
PHOTOPHOBIA: 0
MEMORY LOSS: 0
DOUBLE VISION: 0

## 2017-03-13 ASSESSMENT — PAIN SCALES - GENERAL
PAINLEVEL_OUTOF10: 5
PAINLEVEL_OUTOF10: 5
PAINLEVEL_OUTOF10: 7
PAINLEVEL_OUTOF10: 4
PAINLEVEL_OUTOF10: 3
PAINLEVEL_OUTOF10: 3
PAINLEVEL_OUTOF10: 6
PAINLEVEL_OUTOF10: 0

## 2017-03-13 ASSESSMENT — PATIENT HEALTH QUESTIONNAIRE - PHQ9
SUM OF ALL RESPONSES TO PHQ9 QUESTIONS 1 AND 2: 0
SUM OF ALL RESPONSES TO PHQ QUESTIONS 1-9: 0
SUM OF ALL RESPONSES TO PHQ9 QUESTIONS 1 AND 2: 0
1. LITTLE INTEREST OR PLEASURE IN DOING THINGS: NOT AT ALL
1. LITTLE INTEREST OR PLEASURE IN DOING THINGS: NOT AT ALL
2. FEELING DOWN, DEPRESSED, IRRITABLE, OR HOPELESS: NOT AT ALL
SUM OF ALL RESPONSES TO PHQ QUESTIONS 1-9: 0
2. FEELING DOWN, DEPRESSED, IRRITABLE, OR HOPELESS: NOT AT ALL

## 2017-03-13 NOTE — DISCHARGE PLANNING
SW reviewed pts account financial notes. Per PFA pt was given a FAP application to fill out. This pt is not an [place] citizen and does not qualify for emergency medicaid.     Plan: SW to escalate to supervisor regarding plan moving forward.

## 2017-03-13 NOTE — PROGRESS NOTES
AAOx4. Amb SBA with walker. Daughter is at beside and assists with ambulation. Patient is waiting clearance from PT to see if she is safe to go home. Pt and daughter were walking in halls with walker earlier today. Pt speaks limited English, Daughter translates. Accuchecks ACHS. PIV SL> pt takes roxicodone for pain q 4 hours prn. Urine collected and sent to lab today.

## 2017-03-13 NOTE — FACE TO FACE
Face to Face Supporting Documentation - Home Health    The encounter with this patient was in whole or in part the primary reason for home health admission.    Date of encounter:   Patient:                    MRN:                       YOB: 2017  Analia Martel  [id number]  1945     Home health to see patient for:  Skilled Nursing care for assessment, interventions & education and Physical Therapy evaluation and treatment    Skilled need for:  Surgical Aftercare right hip displaced intertrochanteric femur fracture    Skilled nursing interventions to include:  Comment: none    Homebound status evidenced by:  Needs the assistance of another person in order to leave the home. Leaving home requires a considerable and taxing effort. There is a normal inability to leave the home.    Community Physician to provide follow up care: Pcp Pt States None     Optional Interventions? No      I certify the face to face encounter for this home health care referral meets the CMS requirements and the encounter/clinical assessment with the patient was, in whole, or in part, for the medical condition(s) listed above, which is the primary reason for home health care. Based on my clinical findings: the service(s) are medically necessary, support the need for home health care, and the homebound criteria are met.  I certify that this patient has had a face to face encounter by myself.  Blanca Broussard M.D. - NPI: [id number]

## 2017-03-13 NOTE — CARE PLAN
Problem: Safety  Goal: Will remain free from injury  Outcome: PROGRESSING AS EXPECTED  Pt uses call light appropriately.

## 2017-03-13 NOTE — PROGRESS NOTES
Hospital Medicine Progress Note, Adult, Complex               Author: Blanca Hemalbruno Date & Time created: 3/13/2017  7:46 AM     CC: 71 y.o female with hx of HTN admitted for a mechanical ground level fall and sustained  intertrochanteric fracture    Interval History:    3/8  No issues overnight, patient still feels dizzy, and tired appearing. Patient denies fevers/chills, chest pain, shortness of breath or nausea/vommiting.   PT/OT evaluated, most likely needs rehab.  Perform orthostatics, sit up in chair.   Pending insurance.    3/9  Patient feeling better overall she was able to tolerate 1hr of sitting up in the chair. Dizziness is near resolved, but still feels weak. Patient denies fevers/chills, chest pain, shortness of breath or nausea/vommiting.     3/10  Pain 5/10 right hip going down.  No nausea currently.  Sitting up in chair.     3/11  Pt was able to walk out into the hallways today and to the restroom many times per RN. Contact assist and FWW per RN. Pain 5/10.      3/12   No overnight events.    3/13   No overnight events.      Consults:  Ortho- [facility]    Review of Systems:grossly unchanged  Review of Systems   Constitutional: Positive for malaise/fatigue. Negative for fever and chills.   HENT: Negative for congestion, hearing loss, sore throat and tinnitus.    Eyes: Negative for double vision and photophobia.   Respiratory: Negative for cough, shortness of breath, wheezing and stridor.    Cardiovascular: Negative for chest pain and palpitations.   Gastrointestinal: Negative for heartburn, nausea, vomiting, abdominal pain and diarrhea.   Genitourinary: Positive for urgency and frequency. Negative for dysuria.   Musculoskeletal: Positive for joint pain (Right hip pain, 5/10).   Skin: Negative.    Neurological: Positive for weakness. Negative for dizziness, tingling, tremors, focal weakness and headaches.   Psychiatric/Behavioral: Negative for depression, suicidal ideas and memory loss. The patient  does not have insomnia.        Physical Exam:grossly unchanged.  Physical Exam   Constitutional: She is oriented to person, place, and time. She appears well-developed and well-nourished. No distress.   Tired appearing     HENT:   Head: Normocephalic and atraumatic.   Right Ear: External ear normal.   Left Ear: External ear normal.   Eyes: Pupils are equal, round, and reactive to light. Right eye exhibits no discharge. Left eye exhibits no discharge.   Neck: Neck supple. No JVD present. No thyromegaly present.   Cardiovascular: Normal rate, regular rhythm and normal heart sounds.    Pulmonary/Chest: Effort normal and breath sounds normal. No stridor. No respiratory distress. She exhibits no tenderness.   Abdominal: Soft. Bowel sounds are normal. She exhibits no distension. There is no tenderness. There is no rebound and no guarding.   Musculoskeletal: Normal range of motion. She exhibits tenderness (right hip area improving). She exhibits no edema.   Limited range of motion right leg due to pain     Neurological: She is alert and oriented to person, place, and time. No cranial nerve deficit.   Skin: Skin is warm and dry. She is not diaphoretic. No erythema.   Psychiatric: She has a normal mood and affect. Her behavior is normal. Judgment and thought content normal.   Nursing note and vitals reviewed.      Labs:        Invalid input(s): [id number]      Recent Labs      03/11/17   0412   SODIUM  139   POTASSIUM  4.6   CHLORIDE  104   CO2  30   BUN  12   CREATININE  0.52   CALCIUM  8.9     Recent Labs      03/11/17   0412   GLUCOSE  118*     Recent Labs      03/11/17   0412  03/12/17   0300   RBC  4.29  4.25   HEMOGLOBIN  10.1*  10.0*   HEMATOCRIT  31.4*  30.8*   PLATELETCT  297  339     Recent Labs      03/11/17   0412  03/12/17   0300   WBC  7.1  8.3   NEUTSPOLYS  60.60  56.50   LYMPHOCYTES  20.30*  22.90   MONOCYTES  10.70  12.20   EOSINOPHILS  6.60  6.40   BASOPHILS  0.80  0.80           Hemodynamics:  Temp  (24hrs), Av.1 °C (98.8 °F), Min:36.9 °C (98.4 °F), Max:37.4 °C (99.4 °F)  Temperature: 37.4 °C (99.4 °F)  Pulse  Av.2  Min: 61  Max: 93   Blood Pressure : 156/70 mmHg     Respiratory:    Respiration: 17, Pulse Oximetry: 90 %        RUL Breath Sounds: Clear, RML Breath Sounds: Clear, RLL Breath Sounds: Diminished, TRUDI Breath Sounds: Clear, LLL Breath Sounds: Diminished  Fluids:    Intake/Output Summary (Last 24 hours) at 17 0746  Last data filed at 17 1300   Gross per 24 hour   Intake    480 ml   Output      0 ml   Net    480 ml        GI/Nutrition:  Orders Placed This Encounter   Procedures   • DIET ORDER     Standing Status: Standing      Number of Occurrences: 1      Standing Expiration Date:      Order Specific Question:  Diet:     Answer:  Diabetic [3]     Medical Decision Making, by Problem:  Active Hospital Problems    Diagnosis   • Femoral neck fracture (CMS-HCC) [S72.009A]  - s/p IM nail of right hip after sustaining a displaced intertrochanteric femur fracture  - WBAT RLE  - Pain control  - PT/OT  -Dx:Right femoral neck fracture  -ortho following  -Sutures/Staples out- 10-14 days post operatively- 3/14-3/18. Follow up 3/24 at 1:30 pm  -PT/OT recommends home health, ordered pending acceptance. Will need FWW but no insurance? Discuss with CC in am.    • Microcytic anemia, likely chronic disease  - Acute on chronic 2/2 to post operative blood loss  - Continue Ferrous sulfate tabs.    • DM type 2 (diabetes mellitus, type 2) (CMS-HCC) [E11.9]  -  Continue Insulin-sliding scale, accu-checks and hypoglycemia protocol.  - BS goal inpatient < 180  -metformin held     *Near Syncope  - Echocardiogram essentially normal.  -Carotid duplex:Antegrade flow, bilateral vertebral arteries.    Flow within both subclavian arteries appears to be within normal limits.    Mild bilateral cervical internal carotid artery stenosis (<50%).   - no issues no episodes of syncope .    *Mild hyponatremia  -  resolved.    *Hypokalemia  -resolved    *Prolonged qt  -monitor electrolytes and medications    *Acute hypoxic respiratory insufficiency, postoperative; resolved  -likely due to post operative atelectasis  -lungs clear on exam  -continue IS, weaned off oxygen    *urinary frequency  -query UTI, pending UA    Patient plan of care discussed at multidisplinary team rounds and with patient and R.N at beside.  Full code     Labs reviewed, Medications reviewed and Radiology images reviewed  Pearson catheter: No Pearson      DVT Prophylaxis: Heparin    Ulcer prophylaxis: Not indicated

## 2017-03-13 NOTE — CARE PLAN
Problem: Nutritional:  Goal: Achieve adequate nutritional intake  Patient will consume 50% of meals   Outcome: MET Date Met:  03/13/17  -PO intake between 50-75%

## 2017-03-13 NOTE — DISCHARGE PLANNING
BASIA spoke with Carina GEORGE Supervisor regarding pt. Carina to look into pts chart as well. BASIA put pt on difficult discharge in flowsheets.

## 2017-03-14 LAB
ANION GAP SERPL CALC-SCNC: 7 MMOL/L (ref 0–11.9)
BASOPHILS # BLD AUTO: 1.2 % (ref 0–1.8)
BASOPHILS # BLD: 0.08 K/UL (ref 0–0.12)
BUN SERPL-MCNC: 25 MG/DL (ref 8–22)
CALCIUM SERPL-MCNC: 9.1 MG/DL (ref 8.5–10.5)
CHLORIDE SERPL-SCNC: 100 MMOL/L (ref 96–112)
CO2 SERPL-SCNC: 28 MMOL/L (ref 20–33)
CREAT SERPL-MCNC: 0.67 MG/DL (ref 0.5–1.4)
EOSINOPHIL # BLD AUTO: 0.34 K/UL (ref 0–0.51)
EOSINOPHIL NFR BLD: 5 % (ref 0–6.9)
ERYTHROCYTE [DISTWIDTH] IN BLOOD BY AUTOMATED COUNT: 52.8 FL (ref 35.9–50)
GFR SERPL CREATININE-BSD FRML MDRD: >60 ML/MIN/1.73 M 2
GLUCOSE BLD-MCNC: 124 MG/DL (ref 65–99)
GLUCOSE BLD-MCNC: 142 MG/DL (ref 65–99)
GLUCOSE BLD-MCNC: 163 MG/DL (ref 65–99)
GLUCOSE BLD-MCNC: 229 MG/DL (ref 65–99)
GLUCOSE SERPL-MCNC: 136 MG/DL (ref 65–99)
HCT VFR BLD AUTO: 32.3 % (ref 37–47)
HGB BLD-MCNC: 10.5 G/DL (ref 12–16)
IMM GRANULOCYTES # BLD AUTO: 0.06 K/UL (ref 0–0.11)
IMM GRANULOCYTES NFR BLD AUTO: 0.9 % (ref 0–0.9)
LYMPHOCYTES # BLD AUTO: 1.66 K/UL (ref 1–4.8)
LYMPHOCYTES NFR BLD: 24.3 % (ref 22–41)
MCH RBC QN AUTO: 23.3 PG (ref 27–33)
MCHC RBC AUTO-ENTMCNC: 32.5 G/DL (ref 33.6–35)
MCV RBC AUTO: 71.8 FL (ref 81.4–97.8)
MONOCYTES # BLD AUTO: 0.88 K/UL (ref 0–0.85)
MONOCYTES NFR BLD AUTO: 12.9 % (ref 0–13.4)
NEUTROPHILS # BLD AUTO: 3.82 K/UL (ref 2–7.15)
NEUTROPHILS NFR BLD: 55.7 % (ref 44–72)
NRBC # BLD AUTO: 0 K/UL
NRBC BLD AUTO-RTO: 0 /100 WBC
PLATELET # BLD AUTO: 415 K/UL (ref 164–446)
PMV BLD AUTO: 9.5 FL (ref 9–12.9)
POTASSIUM SERPL-SCNC: 4.4 MMOL/L (ref 3.6–5.5)
RBC # BLD AUTO: 4.5 M/UL (ref 4.2–5.4)
SODIUM SERPL-SCNC: 135 MMOL/L (ref 135–145)
WBC # BLD AUTO: 6.8 K/UL (ref 4.8–10.8)

## 2017-03-14 PROCEDURE — 700102 HCHG RX REV CODE 250 W/ 637 OVERRIDE(OP): Performed by: HOSPITALIST

## 2017-03-14 PROCEDURE — 80048 BASIC METABOLIC PNL TOTAL CA: CPT

## 2017-03-14 PROCEDURE — A9270 NON-COVERED ITEM OR SERVICE: HCPCS | Performed by: HOSPITALIST

## 2017-03-14 PROCEDURE — 36415 COLL VENOUS BLD VENIPUNCTURE: CPT

## 2017-03-14 PROCEDURE — 700102 HCHG RX REV CODE 250 W/ 637 OVERRIDE(OP): Performed by: ORTHOPAEDIC SURGERY

## 2017-03-14 PROCEDURE — 770006 HCHG ROOM/CARE - MED/SURG/GYN SEMI*

## 2017-03-14 PROCEDURE — 82962 GLUCOSE BLOOD TEST: CPT | Mod: 91

## 2017-03-14 PROCEDURE — A9270 NON-COVERED ITEM OR SERVICE: HCPCS | Performed by: ORTHOPAEDIC SURGERY

## 2017-03-14 PROCEDURE — 700102 HCHG RX REV CODE 250 W/ 637 OVERRIDE(OP): Performed by: INTERNAL MEDICINE

## 2017-03-14 PROCEDURE — 99232 SBSQ HOSP IP/OBS MODERATE 35: CPT | Performed by: INTERNAL MEDICINE

## 2017-03-14 PROCEDURE — 700111 HCHG RX REV CODE 636 W/ 250 OVERRIDE (IP): Performed by: ORTHOPAEDIC SURGERY

## 2017-03-14 PROCEDURE — 85025 COMPLETE CBC W/AUTO DIFF WBC: CPT

## 2017-03-14 PROCEDURE — A9270 NON-COVERED ITEM OR SERVICE: HCPCS | Performed by: INTERNAL MEDICINE

## 2017-03-14 RX ORDER — OXYBUTYNIN CHLORIDE 5 MG/1
5 TABLET ORAL 2 TIMES DAILY
Status: DISCONTINUED | OUTPATIENT
Start: 2017-03-14 | End: 2017-03-15 | Stop reason: HOSPADM

## 2017-03-14 RX ADMIN — INSULIN LISPRO 1 UNITS: 100 INJECTION, SOLUTION INTRAVENOUS; SUBCUTANEOUS at 11:34

## 2017-03-14 RX ADMIN — Medication 2 TABLET: at 20:11

## 2017-03-14 RX ADMIN — LISINOPRIL 5 MG: 10 TABLET ORAL at 09:13

## 2017-03-14 RX ADMIN — HEPARIN SODIUM 5000 UNITS: 5000 INJECTION, SOLUTION INTRAVENOUS; SUBCUTANEOUS at 13:28

## 2017-03-14 RX ADMIN — OXYBUTYNIN CHLORIDE 5 MG: 5 TABLET ORAL at 09:15

## 2017-03-14 RX ADMIN — OXYCODONE HYDROCHLORIDE 5 MG: 5 TABLET ORAL at 11:26

## 2017-03-14 RX ADMIN — OXYCODONE HYDROCHLORIDE 10 MG: 10 TABLET ORAL at 01:06

## 2017-03-14 RX ADMIN — OXYCODONE HYDROCHLORIDE 5 MG: 5 TABLET ORAL at 07:28

## 2017-03-14 RX ADMIN — HEPARIN SODIUM 5000 UNITS: 5000 INJECTION, SOLUTION INTRAVENOUS; SUBCUTANEOUS at 22:00

## 2017-03-14 RX ADMIN — OXYBUTYNIN CHLORIDE 5 MG: 5 TABLET ORAL at 20:11

## 2017-03-14 RX ADMIN — INSULIN LISPRO 2 UNITS: 100 INJECTION, SOLUTION INTRAVENOUS; SUBCUTANEOUS at 16:58

## 2017-03-14 RX ADMIN — FERROUS GLUCONATE 324 MG: 324 TABLET ORAL at 07:28

## 2017-03-14 RX ADMIN — OXYCODONE HYDROCHLORIDE 10 MG: 10 TABLET ORAL at 20:12

## 2017-03-14 RX ADMIN — OXYCODONE HYDROCHLORIDE 5 MG: 5 TABLET ORAL at 15:28

## 2017-03-14 RX ADMIN — NICOTINE 14 MG: 14 PATCH TRANSDERMAL at 07:28

## 2017-03-14 RX ADMIN — HEPARIN SODIUM 5000 UNITS: 5000 INJECTION, SOLUTION INTRAVENOUS; SUBCUTANEOUS at 07:28

## 2017-03-14 ASSESSMENT — ENCOUNTER SYMPTOMS
PALPITATIONS: 0
FEVER: 0
WEAKNESS: 1
BLURRED VISION: 0
NAUSEA: 0
HEADACHES: 0
DEPRESSION: 0
ABDOMINAL PAIN: 0
FOCAL WEAKNESS: 0
DIZZINESS: 0
VOMITING: 0
COUGH: 0
SHORTNESS OF BREATH: 0

## 2017-03-14 ASSESSMENT — PAIN SCALES - GENERAL
PAINLEVEL_OUTOF10: 5
PAINLEVEL_OUTOF10: 6
PAINLEVEL_OUTOF10: 4
PAINLEVEL_OUTOF10: 6
PAINLEVEL_OUTOF10: 3
PAINLEVEL_OUTOF10: 6
PAINLEVEL_OUTOF10: 3

## 2017-03-14 ASSESSMENT — LIFESTYLE VARIABLES: DO YOU DRINK ALCOHOL: NO

## 2017-03-14 NOTE — PROGRESS NOTES
"   Orthopaedic Progress Note    Interval changes:  Dressings CDI  Pending SNF placment     ROS - Patient denies any new issues.  Pain well controlled.    Blood pressure 125/86, pulse 71, temperature 37 °C (98.6 °F), resp. rate 18, height 1.473 m (4' 10\"), weight 40.824 kg (90 lb), SpO2 95 %, not currently breastfeeding.      Patient seen and examined  No acute distress  Breathing non labored  RRR  Right hip dressings CDI.  Patient clearly fires tibialis anterior, EHL, and gastrocnemius/soleus. Sensation is intact to light touch throughout superficial peroneal, deep peroneal, tibial, saphenous, and sural nerve distributions. Strong and palpable 2+ dorsalis pedis and posterior tibial pulses with capillary refill less than 2 seconds. No lower leg tenderness or discomfort.       Recent Labs      03/12/17   0300  03/14/17   0019   WBC  8.3  6.8   RBC  4.25  4.50   HEMOGLOBIN  10.0*  10.5*   HEMATOCRIT  30.8*  32.3*   MCV  72.5*  71.8*   MCH  23.5*  23.3*   MCHC  32.5*  32.5*   RDW  52.0*  52.8*   PLATELETCT  339  415   MPV  9.8  9.5       Active Hospital Problems    Diagnosis   • Urinary frequency [R35.0]   • Acute respiratory insufficiency, postoperative (CMS-HCC) [J95.2]   • Syncope [R55]   • Anemia [D64.9]   • Hyponatremia [E87.1]   • Prolonged QT interval [R94.31]   • Hypokalemia [E87.6]   • Femoral neck fracture (CMS-HCC) [S72.009A]   • DM type 2 (diabetes mellitus, type 2) (CMS-HCC) [E11.9]       Assessment/Plan:  Patient doing well  Cleared for DC by ortho for SNF placement pending medicine clearance  POD#10 S/P Open treatment with intramedullary nailing, right displaced intertrochanteric femur fracture  Wt bearing status - WBAT  Wound care/Drains - dressings CDI  Future Procedures - none planned  Sutures/Staples out- 10-14 days post operatively  PT/OT-initiated  Antibiotics: completed  DVT Prophylaxis- TEDS/SCDs/Foot pumps/heparin  Pearson-none  Case Coordination for Discharge Planning - Disposition SNF     "

## 2017-03-14 NOTE — PROGRESS NOTES
Hospital Medicine Progress Note, Adult, Complex               Author: Christianalesha Womack Date & Time created: 3/14/2017  3:06 PM     CC: 71 y.o female with hx of HTN admitted for a mechanical ground level fall and sustained  intertrochanteric fracture    Interval History:    Hip fracture-doing well today. Pain is minor, no new numbness or weakness reported.   Urinary incontinence-new since surgery. Not worsened with coughing or bearing down. Sounds like stress incontinence.    Consults:  Ortho- [place]    Review of Systems:grossly unchanged  Review of Systems   Constitutional: Negative for fever and malaise/fatigue.   HENT: Negative for hearing loss and tinnitus.    Eyes: Negative for blurred vision.   Respiratory: Negative for cough and shortness of breath.    Cardiovascular: Negative for chest pain and palpitations.   Gastrointestinal: Negative for nausea, vomiting and abdominal pain.   Genitourinary: Positive for urgency and frequency. Negative for dysuria and hematuria.        About the same today   Musculoskeletal: Positive for joint pain (Right hip pain, less intense today).   Skin: Negative.    Neurological: Positive for weakness. Negative for dizziness, focal weakness and headaches.   Psychiatric/Behavioral: Negative for depression and suicidal ideas.       Physical Exam:.  Physical Exam   Constitutional: She is oriented to person, place, and time. She appears well-developed and well-nourished. No distress.        HENT:   Head: Normocephalic and atraumatic.   Right Ear: External ear normal.   Left Ear: External ear normal.   Eyes: Pupils are equal, round, and reactive to light. Right eye exhibits no discharge. Left eye exhibits no discharge.   Neck: Neck supple. No JVD present.   Cardiovascular: Normal rate, regular rhythm and normal heart sounds.    Pulmonary/Chest: Effort normal and breath sounds normal. No stridor. No respiratory distress. She exhibits no tenderness.   Abdominal: Soft. Bowel sounds are  normal. She exhibits no distension. There is no tenderness.   Musculoskeletal: Normal range of motion. She exhibits tenderness (right hip area improving). She exhibits no edema.   Limited range of motion right leg due to pain  Surgical site appears C/D/I, no fluctuance appreciated     Neurological: She is alert and oriented to person, place, and time. No cranial nerve deficit.   Skin: Skin is warm and dry. She is not diaphoretic. No erythema.   Psychiatric: She has a normal mood and affect. Her behavior is normal. Judgment and thought content normal.   Nursing note and vitals reviewed.      Labs:        Invalid input(s): [id number]      Recent Labs      17   0019   SODIUM  135   POTASSIUM  4.4   CHLORIDE  100   CO2  28   BUN  25*   CREATININE  0.67   CALCIUM  9.1     Recent Labs      17   0019   GLUCOSE  136*     Recent Labs      17   0300  17   0019   RBC  4.25  4.50   HEMOGLOBIN  10.0*  10.5*   HEMATOCRIT  30.8*  32.3*   PLATELETCT  339  415     Recent Labs      17   0300  17   0019   WBC  8.3  6.8   NEUTSPOLYS  56.50  55.70   LYMPHOCYTES  22.90  24.30   MONOCYTES  12.20  12.90   EOSINOPHILS  6.40  5.00   BASOPHILS  0.80  1.20           Hemodynamics:  Temp (24hrs), Av.8 °C (98.2 °F), Min:36.4 °C (97.6 °F), Max:37 °C (98.6 °F)  Temperature: 37 °C (98.6 °F)  Pulse  Av.1  Min: 61  Max: 93   Blood Pressure : 125/86 mmHg     Respiratory:    Respiration: 18, Pulse Oximetry: 95 %        RUL Breath Sounds: Clear, RML Breath Sounds: Clear, RLL Breath Sounds: Diminished, TRUDI Breath Sounds: Clear, LLL Breath Sounds: Diminished  Fluids:  No intake or output data in the 24 hours ending 17 1506     GI/Nutrition:  Orders Placed This Encounter   Procedures   • DIET ORDER     Standing Status: Standing      Number of Occurrences: 1      Standing Expiration Date:      Order Specific Question:  Diet:     Answer:  Diabetic [3]     Medical Decision Making, by Problem:  Active  Hospital Problems    Diagnosis   • Femoral neck fracture (CMS-HCC) [S72.009A]  - s/p IM nail of right hip after sustaining a displaced intertrochanteric femur fracture  - WBAT RLE  - Pain control  - PT/OT already done  -Dx:Right femoral neck fracture  -ortho following  -Sutures/Staples out- 10-14 days post operatively- 3/14-3/18. Follow up 3/24 at 1:30 pm  -PT/OT recommends home health, working with SW on insurance issues and getting FWW, no other new changes   • Microcytic anemia, likely chronic disease  - Acute on chronic 2/2 to post operative blood loss  - Continue Ferrous sulfate tabs.    • DM type 2 (diabetes mellitus, type 2) (CMS-HCC) [E11.9]-sugars remain well controlled  -  Continue Insulin-sliding scale, accu-checks and hypoglycemia protocol.  - BS goal inpatient < 180  -metformin held     *Near Syncope-resolved.  - Echocardiogram essentially normal.  - Carotid duplex with mild plaque, w/u negative, no recurrence here    *Mild hyponatremia  - resolved.    *Hypokalemia  -resolved    *Prolonged qt  -monitor electrolytes and medications    *Acute hypoxic respiratory insufficiency, postoperative; resolved  -likely due to post operative atelectasis  -lungs clear on exam  -continue IS, weaned off oxygen    *urinary frequency  -UA negative for UTI  -likely urge incontinence??  -start low dose ditropan and re-assess    Patient plan of care discussed at multidisplinary team rounds and with patient and R.N at beside.  Full code     Labs reviewed, Medications reviewed and Radiology images reviewed  Pearson catheter: No Pearson      DVT Prophylaxis: Heparin    Ulcer prophylaxis: Not indicated

## 2017-03-15 ENCOUNTER — PATIENT OUTREACH (OUTPATIENT)
Dept: HEALTH INFORMATION MANAGEMENT | Facility: OTHER | Age: 72
End: 2017-03-15

## 2017-03-15 VITALS
SYSTOLIC BLOOD PRESSURE: 133 MMHG | BODY MASS INDEX: 18.89 KG/M2 | HEIGHT: 58 IN | DIASTOLIC BLOOD PRESSURE: 62 MMHG | OXYGEN SATURATION: 100 % | WEIGHT: 90 LBS | RESPIRATION RATE: 14 BRPM | HEART RATE: 61 BPM | TEMPERATURE: 98.8 F

## 2017-03-15 PROBLEM — E87.1 HYPONATREMIA: Status: RESOLVED | Noted: 2017-03-10 | Resolved: 2017-03-15

## 2017-03-15 PROBLEM — S72.009A FEMORAL NECK FRACTURE (HCC): Status: RESOLVED | Noted: 2017-03-04 | Resolved: 2017-03-15

## 2017-03-15 PROBLEM — E87.6 HYPOKALEMIA: Status: RESOLVED | Noted: 2017-03-10 | Resolved: 2017-03-15

## 2017-03-15 PROBLEM — J95.89 ACUTE RESPIRATORY INSUFFICIENCY, POSTOPERATIVE: Status: RESOLVED | Noted: 2017-03-11 | Resolved: 2017-03-15

## 2017-03-15 PROBLEM — R55 SYNCOPE: Status: RESOLVED | Noted: 2017-03-10 | Resolved: 2017-03-15

## 2017-03-15 LAB
GLUCOSE BLD-MCNC: 135 MG/DL (ref 65–99)
GLUCOSE BLD-MCNC: 165 MG/DL (ref 65–99)
GLUCOSE BLD-MCNC: 198 MG/DL (ref 65–99)

## 2017-03-15 PROCEDURE — 97535 SELF CARE MNGMENT TRAINING: CPT

## 2017-03-15 PROCEDURE — 99232 SBSQ HOSP IP/OBS MODERATE 35: CPT | Performed by: INTERNAL MEDICINE

## 2017-03-15 PROCEDURE — 302053 BORDER GAUZE 4 X 4"": Performed by: INTERNAL MEDICINE

## 2017-03-15 PROCEDURE — 700102 HCHG RX REV CODE 250 W/ 637 OVERRIDE(OP): Performed by: INTERNAL MEDICINE

## 2017-03-15 PROCEDURE — 700102 HCHG RX REV CODE 250 W/ 637 OVERRIDE(OP): Performed by: ORTHOPAEDIC SURGERY

## 2017-03-15 PROCEDURE — 700102 HCHG RX REV CODE 250 W/ 637 OVERRIDE(OP): Performed by: HOSPITALIST

## 2017-03-15 PROCEDURE — A9270 NON-COVERED ITEM OR SERVICE: HCPCS | Performed by: HOSPITALIST

## 2017-03-15 PROCEDURE — 700111 HCHG RX REV CODE 636 W/ 250 OVERRIDE (IP): Performed by: ORTHOPAEDIC SURGERY

## 2017-03-15 PROCEDURE — A9270 NON-COVERED ITEM OR SERVICE: HCPCS | Performed by: INTERNAL MEDICINE

## 2017-03-15 PROCEDURE — A9270 NON-COVERED ITEM OR SERVICE: HCPCS | Performed by: ORTHOPAEDIC SURGERY

## 2017-03-15 PROCEDURE — 82962 GLUCOSE BLOOD TEST: CPT | Mod: 91

## 2017-03-15 RX ORDER — FERROUS GLUCONATE 324(38)MG
324 TABLET ORAL
Qty: 30 TAB | Refills: 1 | Status: SHIPPED | OUTPATIENT
Start: 2017-03-15

## 2017-03-15 RX ORDER — OXYCODONE HYDROCHLORIDE 5 MG/1
5 TABLET ORAL EVERY 4 HOURS PRN
Qty: 15 TAB | Refills: 0 | Status: SHIPPED | OUTPATIENT
Start: 2017-03-15

## 2017-03-15 RX ORDER — LISINOPRIL 5 MG/1
5 TABLET ORAL DAILY
Qty: 30 TAB | Refills: 1 | Status: SHIPPED | OUTPATIENT
Start: 2017-03-15

## 2017-03-15 RX ORDER — OXYBUTYNIN CHLORIDE 5 MG/1
5 TABLET ORAL 2 TIMES DAILY
Qty: 90 TAB | Refills: 1 | Status: SHIPPED | OUTPATIENT
Start: 2017-03-15

## 2017-03-15 RX ADMIN — NICOTINE 14 MG: 14 PATCH TRANSDERMAL at 06:08

## 2017-03-15 RX ADMIN — HEPARIN SODIUM 5000 UNITS: 5000 INJECTION, SOLUTION INTRAVENOUS; SUBCUTANEOUS at 14:32

## 2017-03-15 RX ADMIN — OXYCODONE HYDROCHLORIDE 5 MG: 5 TABLET ORAL at 14:32

## 2017-03-15 RX ADMIN — INSULIN LISPRO 1 UNITS: 100 INJECTION, SOLUTION INTRAVENOUS; SUBCUTANEOUS at 17:07

## 2017-03-15 RX ADMIN — OXYCODONE HYDROCHLORIDE 5 MG: 5 TABLET ORAL at 06:09

## 2017-03-15 RX ADMIN — OXYCODONE HYDROCHLORIDE 10 MG: 10 TABLET ORAL at 17:40

## 2017-03-15 RX ADMIN — OXYBUTYNIN CHLORIDE 5 MG: 5 TABLET ORAL at 09:03

## 2017-03-15 RX ADMIN — HEPARIN SODIUM 5000 UNITS: 5000 INJECTION, SOLUTION INTRAVENOUS; SUBCUTANEOUS at 06:08

## 2017-03-15 RX ADMIN — FERROUS GLUCONATE 324 MG: 324 TABLET ORAL at 09:03

## 2017-03-15 RX ADMIN — OXYCODONE HYDROCHLORIDE 5 MG: 5 TABLET ORAL at 10:09

## 2017-03-15 RX ADMIN — Medication 2 TABLET: at 09:03

## 2017-03-15 RX ADMIN — OXYCODONE HYDROCHLORIDE 10 MG: 10 TABLET ORAL at 00:14

## 2017-03-15 RX ADMIN — INSULIN LISPRO 1 UNITS: 100 INJECTION, SOLUTION INTRAVENOUS; SUBCUTANEOUS at 11:17

## 2017-03-15 RX ADMIN — LISINOPRIL 5 MG: 10 TABLET ORAL at 09:03

## 2017-03-15 ASSESSMENT — ENCOUNTER SYMPTOMS
DEPRESSION: 0
FOCAL WEAKNESS: 0
COUGH: 0
PHOTOPHOBIA: 0
PALPITATIONS: 0
ABDOMINAL PAIN: 0
WEAKNESS: 1
SHORTNESS OF BREATH: 0
DIZZINESS: 0
DOUBLE VISION: 0
FEVER: 0
HEADACHES: 0

## 2017-03-15 ASSESSMENT — PAIN SCALES - GENERAL
PAINLEVEL_OUTOF10: 5
PAINLEVEL_OUTOF10: 3
PAINLEVEL_OUTOF10: 3
PAINLEVEL_OUTOF10: 5

## 2017-03-15 ASSESSMENT — LIFESTYLE VARIABLES: EVER_SMOKED: NEVER

## 2017-03-15 NOTE — DISCHARGE PLANNING
SW sent in care chest application. SW will follow up with care chest regarding FWW. BASIA called hospital  to set up PCP for pt at [facility].

## 2017-03-15 NOTE — PROGRESS NOTES
Hospital Medicine Progress Note, Adult, Complex               Author: Christian Womack Date & Time created: 3/15/2017  1:12 PM     CC: 71 y.o female with hx of HTN admitted for a mechanical ground level fall and sustained  intertrochanteric fracture    Interval History:    Hip fracture-doing well today. Very mild pain, is able to ambulate to the BR with help of her daughter.  Urinary incontinence-doing better on ditropan. No other new issues to report today.    Consults:  Ortho- [place]    Review of Systems:grossly unchanged  Review of Systems   Constitutional: Negative for fever and malaise/fatigue.   HENT: Negative for hearing loss and tinnitus.    Eyes: Negative for double vision and photophobia.   Respiratory: Negative for cough and shortness of breath.    Cardiovascular: Negative for chest pain and palpitations.   Gastrointestinal: Negative for abdominal pain.   Genitourinary: Positive for urgency and frequency. Negative for dysuria and hematuria.        Improving   Musculoskeletal: Positive for joint pain (Right hip pain, less intense today).   Skin: Negative.    Neurological: Positive for weakness (improving daily). Negative for dizziness, focal weakness and headaches.   Psychiatric/Behavioral: Negative for depression.       Physical Exam:.  Physical Exam   Constitutional: She is oriented to person, place, and time. She appears well-developed and well-nourished. No distress.        HENT:   Head: Normocephalic and atraumatic.   Right Ear: External ear normal.   Left Ear: External ear normal.   Eyes: Pupils are equal, round, and reactive to light. No scleral icterus.   Neck: Neck supple.   Cardiovascular: Normal rate, regular rhythm and normal heart sounds.    Pulmonary/Chest: Effort normal and breath sounds normal. No stridor. No respiratory distress. She exhibits no tenderness.   Abdominal: Soft. Bowel sounds are normal. There is no tenderness.   Musculoskeletal: Normal range of motion. She exhibits tenderness  (right hip area improving, continues). She exhibits no edema.   Increased ROM of the RLE  Surgical site appears C/D/I, no fluctuance appreciated     Neurological: She is alert and oriented to person, place, and time. No cranial nerve deficit.   Skin: Skin is warm and dry. She is not diaphoretic. No erythema.   Psychiatric: She has a normal mood and affect. Her behavior is normal. Judgment and thought content normal.   Nursing note and vitals reviewed.      Labs:        Invalid input(s): [id number]      Recent Labs      17   0019   SODIUM  135   POTASSIUM  4.4   CHLORIDE  100   CO2  28   BUN  25*   CREATININE  0.67   CALCIUM  9.1     Recent Labs      17   001   GLUCOSE  136*     Recent Labs      17   0019   RBC  4.50   HEMOGLOBIN  10.5*   HEMATOCRIT  32.3*   PLATELETCT  415     Recent Labs      17   001   WBC  6.8   NEUTSPOLYS  55.70   LYMPHOCYTES  24.30   MONOCYTES  12.90   EOSINOPHILS  5.00   BASOPHILS  1.20           Hemodynamics:  Temp (24hrs), Av.5 °C (97.7 °F), Min:36.2 °C (97.1 °F), Max:37 °C (98.6 °F)  Temperature: 37 °C (98.6 °F)  Pulse  Av.9  Min: 58  Max: 93   Blood Pressure : 142/57 mmHg     Respiratory:    Respiration: 18, Pulse Oximetry: 95 %        RUL Breath Sounds: Clear, RML Breath Sounds: Clear, RLL Breath Sounds: Diminished, TRUDI Breath Sounds: Clear, LLL Breath Sounds: Diminished  Fluids:  No intake or output data in the 24 hours ending 03/15/17 1312     GI/Nutrition:  Orders Placed This Encounter   Procedures   • DIET ORDER     Standing Status: Standing      Number of Occurrences: 1      Standing Expiration Date:      Order Specific Question:  Diet:     Answer:  Diabetic [3]     Medical Decision Making, by Problem:  Active Hospital Problems    Diagnosis   • Femoral neck fracture (CMS-HCC) [S72.009A]-doing well, pain well controlled  - s/p IM nail of right hip after sustaining a displaced intertrochanteric femur fracture  - WBAT RLE  - Pain control  - PT/OT  already done  -Dx:Right femoral neck fracture  -ortho following  -Sutures/Staples out- 10-14 days post operatively- 3/14-3/18. Will talk to nursing about removing sutures.  -Follow up 3/24 at 1:30 pm  -PT/OT recommends home health, working with SW on insurance issues and getting FWW, likely will need to buy a FWW at [employer] [facility]   • Microcytic anemia, likely chronic disease  - Acute on chronic 2/2 to post operative blood loss  - Continue Ferrous sulfate tabs.    • DM type 2 (diabetes mellitus, type 2) (CMS-HCC) [E11.9]-sugars remain well controlled again today  -  Continue Insulin-sliding scale, accu-checks and hypoglycemia protocol.  - BS goal inpatient < 180  -metformin held     *Near Syncope-resolved. No new abnormal HR's in the last 24 hours  - Echocardiogram essentially normal.  - Carotid duplex with mild plaque, w/u negative, no recurrence here    *Mild hyponatremia  - resolved.    *Hypokalemia  -resolved    *Prolonged qt  -monitor electrolytes and medications    *Acute hypoxic respiratory insufficiency, postoperative; resolved  -likely due to post operative atelectasis  -lungs clear on exam  -continue IS, weaned off oxygen    *urinary frequency-slowly improving  -UA negative for UTI  -likely urge incontinence??  -continue low dose ditropan and re-assess    Patient plan of care discussed at multidisplinary team rounds and with patient and R.N at beside.  Full code     Labs reviewed, Medications reviewed and Radiology images reviewed  Pearson catheter: No Pearson      DVT Prophylaxis: Heparin    Ulcer prophylaxis: Not indicated

## 2017-03-15 NOTE — DISCHARGE PLANNING
[facility]. SW received cash prices for pt to pay.     Fergon: 14.75  Prinivil: 11.25  Ditropan: 23.00    SW relayed all information to pt and pts dtr.

## 2017-03-15 NOTE — DISCHARGE PLANNING
SW spoke with Natali at [facility]. Pt has been approved for a FWW and come and pick it up when she is D/C. Natali also stated they would be able to assist with medications. Natali to call pts dtr regarding medication assistance.

## 2017-03-15 NOTE — DISCHARGE PLANNING
BASIA spoke with Natali at [facility]. Natali stated to send the application via email. BASIA scanned documents and sent to Natali. SW awaiting phone call on approval.

## 2017-03-15 NOTE — DISCHARGE INSTRUCTIONS
Discharge Instructions    Discharged to home by car with relative. Discharged via wheelchair, hospital escort: Yes.  Special equipment needed: Not Applicable and Walker    Be sure to schedule a follow-up appointment with your primary care doctor or any specialists as instructed.     Discharge Plan:   Diet Plan: Discussed  Activity Level: Discussed  Smoking Cessation Offered: Patient Refused  Confirmed Follow up Appointment: Appointment Scheduled  Confirmed Symptoms Management: Discussed  Medication Reconciliation Updated: Yes  Influenza Vaccine Indication: Not indicated: Previously immunized this influenza season and > 8 years of age  Influenza Vaccine Given - only chart on this line when given: Influenza Vaccine Given (See MAR)    I understand that a diet low in cholesterol, fat, and sodium is recommended for good health. Unless I have been given specific instructions below for another diet, I accept this instruction as my diet prescription.   Other diet: Return to Regular diet    Special Instructions:   Discharge patient Home   Diet low fat, low sugar, heart healthy with 9-13 servings of fruits and vegetables   Activities as tolerated   Follow ups with PCP in 7-10 days  Medications per medication sheet   No smoking, no alcohol, no caffeine   Wear seat belt in motorized vehicle   Take medications as perscribed   Keep appointments   If symptoms worsen call PCP, 911 or urgent care.    · Is patient discharged on Warfarin / Coumadin?   No     · Is patient Post Blood Transfusion?  No    Depression / Suicide Risk    As you are discharged from this [facility] facility, it is important to learn how to keep safe from harming yourself.    Recognize the warning signs:  · Abrupt changes in personality, positive or negative- including increase in energy   · Giving away possessions  · Change in eating patterns- significant weight changes-  positive or negative  · Change in sleeping patterns- unable to sleep or sleeping all  the time   · Unwillingness or inability to communicate  · Depression  · Unusual sadness, discouragement and loneliness  · Talk of wanting to die  · Neglect of personal appearance   · Rebelliousness- reckless behavior  · Withdrawal from people/activities they love  · Confusion- inability to concentrate     If you or a loved one observes any of these behaviors or has concerns about self-harm, here's what you can do:  · Talk about it- your feelings and reasons for harming yourself  · Remove any means that you might use to hurt yourself (examples: pills, rope, extension cords, firearm)  · Get professional help from the community (Mental Health, Substance Abuse, psychological counseling)  · Do not be alone:Call your Safe Contact- someone whom you trust who will be there for you.  · Call your local CRISIS HOTLINE 528-1858 or 330-987-9581  · Call your local Children's Mobile Crisis Response Team [place] (591) 688-6404 or www.Netstory  · Call the toll free National Suicide Prevention Hotlines   · National Suicide Prevention Lifeline 653-066-ENLY (4721)  · National Hope Line Network 800-SUICIDE (159-7254)

## 2017-03-15 NOTE — PROGRESS NOTES
11 days post op, per MD staples d/c'd to right hip incisions x3, all incisions clean, dry and well approximated, new clean dry dressing applied and pt tolerated procedure

## 2017-03-15 NOTE — PROGRESS NOTES
Assumed care of pt at 0700, pt AAOx4, poor english however family members at bedside translate for pt. Pt pain is 3/10 at this time in the R hip, RN discussed POC and pain medications with pt and family. Pt up with SBA and FWW, doing well. Dressings to R hip in place and CDI. Bed is in low and locked position, call light within reach and hourly rounding in place.

## 2017-03-16 ENCOUNTER — PATIENT OUTREACH (OUTPATIENT)
Dept: HEALTH INFORMATION MANAGEMENT | Facility: OTHER | Age: 72
End: 2017-03-16

## 2017-03-16 NOTE — PROGRESS NOTES
Pt dc'd with all belongings and discharge instructions, all questions and concerns addressed at this time. RN spoke with family members regarding dc, all equiptment and prescriptions. Pt left via wheelchair with hospital escort and all belongings.

## 2017-04-17 NOTE — DISCHARGE SUMMARY
DISCHARGE DIAGNOSES:  1.  Femoral neck fracture of the right side.  2.  Microcytic anemia.  3.  Diabetes mellitus type 2.  4.  Near syncope.  5.  Mild hyponatremia,   6.  Hypokalemia.  6.  Prolonged QT.  7.  Acute hypoxic respiratory failure with insufficiency, postoperative.  8.  Urinary frequency, no evidence of urinary tract infection.    CONSULTATIONS DONE ON THIS HOSPITAL STAY:  Orthopedic surgery.    INTERVENTIONS DONE ON THIS HOSPITAL STAY:  The patient underwent open   treatment and intramedullary nailing of right displaced intertrochanteric   femur fracture.    BLOOD OR BLOOD PRODUCTS RECEIVED ON THIS HOSPITAL STAY:  Received 1 unit   packed red blood cells.    DIAGNOSTIC EVALUATIONS PERFORMED ON THIS HOSPITAL STAY:  IMAGIN.  Carotid duplex.  Antegrade flow, bilateral vertebral arteries.  Flow   within both subclavian arteries appeared to be within normal limits.  Mild   bilateral cervical internal carotid artery stenosis less than 50%.  2.  Echocardiogram.  Left ventricular ejection fraction visually estimated to   be 75%.  Grade II diastolic dysfunction.  Mild left ventricular septal   hypertrophy.  Normal left atrial size.  Unable to estimate pulmonary artery   pressure due to inadequate tricuspid regurgitant jet.  No prior studies   available for comparison.  3.  X-ray of the hip.  Right intertrochanteric femoral neck fracture.  4.  X-ray of the femur.  Right femoral neck fracture.  5.  Portable chest x-ray.  No acute cardiac or pulmonary abnormalities   identified.    COURSE OF HOSPITALIZATION:  The patient is a 71-year-old female with history   of multiple medical problems, who fell down at home.  Patient does not   normally live here.  She was found to have a right intertrochanteric femoral   neck fracture.  She underwent successful open treatment with intramedullary   nailing of right displaced intertrochanteric femur fracture by Dr. Jean Carlos Ozuna on 2017.  Please refer to his operative  report for further   details.  Patient given that she is not a legal citizen here and has no   insurance required full rehab here.  Eventually, PT cleared to go home with a   front wheel walker and will also set up home health for her as well.  Daughter   is very supportive and can take care of her.  Additionally, she required 1   unit packed red blood cells and her discharge H and H was 10.5 and 32.3 with   MCP of 71, which seemed to be outpatient workup.  She was noted to have low   iron and received IV iron.  I recommended that she get an outpatient workup in   the [facility].  Otherwise, she was deemed stable for discharge.    DISPOSITION:  Discharged home.    DIET:  Low fat, low sugar, healthy diet.    ACTIVITIES:  As tolerated.    FOLLOWUP:  Follow up with Dr. Ozuna within 1 or 2 weeks.    MEDICATIONS AT TIME OF DISCHARGE:  1.  Ferrous gluconate 324 mg every morning with breakfast, stop taking   Ibuprofen.  2.  Lisinopril 5 mg tablets daily.  3.  Metformin 1000 mg b.i.d. with meals.  4.  Multivitamin tablet daily.  5.  Oxybutynin 5 mg tablet b.i.d.  6.  _____ 5 mg tablets 1 tablet every 4 hours as needed for a total of 15   tablets.    DISCHARGE INSTRUCTIONS:  The patient at this point is advised no smoking, no   alcohol, no caffeine, wear seatbelt in a motorized vehicle, take medications   as prescribed, and keep appointments as scheduled.  If symptoms worsen or new   ones develop, call the primary care's office or 911.    Discharge process lasted 35 minutes.       ____________________________________     MD CAMDEN SHORT / WAN    DD:  04/15/2017 16:39:41  DT:  04/16/2017 17:19:12    D#:  [id number]  Job#:  [id number]    cc: Jean Carlos Ozuna MD

## 2017-04-30 LAB — EKG IMPRESSION: NORMAL

## 2018-02-16 ENCOUNTER — HOSPITAL ENCOUNTER (EMERGENCY)
Facility: MEDICAL CENTER | Age: 73
End: 2018-02-16
Attending: EMERGENCY MEDICINE
Payer: COMMERCIAL

## 2018-02-16 ENCOUNTER — APPOINTMENT (OUTPATIENT)
Dept: RADIOLOGY | Facility: MEDICAL CENTER | Age: 73
End: 2018-02-16
Attending: EMERGENCY MEDICINE
Payer: COMMERCIAL

## 2018-02-16 VITALS
HEIGHT: 57 IN | RESPIRATION RATE: 16 BRPM | DIASTOLIC BLOOD PRESSURE: 69 MMHG | SYSTOLIC BLOOD PRESSURE: 198 MMHG | TEMPERATURE: 96.3 F | OXYGEN SATURATION: 99 % | HEART RATE: 69 BPM

## 2018-02-16 DIAGNOSIS — R42 VERTIGO: ICD-10-CM

## 2018-02-16 LAB
ALBUMIN SERPL BCP-MCNC: 4.3 G/DL (ref 3.2–4.9)
ALBUMIN/GLOB SERPL: 1.2 G/DL
ALP SERPL-CCNC: 48 U/L (ref 30–99)
ALT SERPL-CCNC: 11 U/L (ref 2–50)
ANION GAP SERPL CALC-SCNC: 9 MMOL/L (ref 0–11.9)
AST SERPL-CCNC: 26 U/L (ref 12–45)
BASOPHILS # BLD AUTO: 1.3 % (ref 0–1.8)
BASOPHILS # BLD: 0.12 K/UL (ref 0–0.12)
BILIRUB SERPL-MCNC: 0.5 MG/DL (ref 0.1–1.5)
BNP SERPL-MCNC: 24 PG/ML (ref 0–100)
BUN SERPL-MCNC: 15 MG/DL (ref 8–22)
CALCIUM SERPL-MCNC: 9.3 MG/DL (ref 8.5–10.5)
CHLORIDE SERPL-SCNC: 103 MMOL/L (ref 96–112)
CO2 SERPL-SCNC: 25 MMOL/L (ref 20–33)
CREAT SERPL-MCNC: 0.7 MG/DL (ref 0.5–1.4)
EKG IMPRESSION: NORMAL
EOSINOPHIL # BLD AUTO: 0.18 K/UL (ref 0–0.51)
EOSINOPHIL NFR BLD: 1.9 % (ref 0–6.9)
ERYTHROCYTE [DISTWIDTH] IN BLOOD BY AUTOMATED COUNT: 34.5 FL (ref 35.9–50)
GLOBULIN SER CALC-MCNC: 3.6 G/DL (ref 1.9–3.5)
GLUCOSE BLD-MCNC: 211 MG/DL (ref 65–99)
GLUCOSE SERPL-MCNC: 204 MG/DL (ref 65–99)
HCT VFR BLD AUTO: 36.3 % (ref 37–47)
HGB BLD-MCNC: 11.1 G/DL (ref 12–16)
IMM GRANULOCYTES # BLD AUTO: 0.1 K/UL (ref 0–0.11)
IMM GRANULOCYTES NFR BLD AUTO: 1.1 % (ref 0–0.9)
LYMPHOCYTES # BLD AUTO: 1.1 K/UL (ref 1–4.8)
LYMPHOCYTES NFR BLD: 11.7 % (ref 22–41)
MAGNESIUM SERPL-MCNC: 2 MG/DL (ref 1.5–2.5)
MCH RBC QN AUTO: 20.6 PG (ref 27–33)
MCHC RBC AUTO-ENTMCNC: 30.6 G/DL (ref 33.6–35)
MCV RBC AUTO: 67.2 FL (ref 81.4–97.8)
MONOCYTES # BLD AUTO: 0.48 K/UL (ref 0–0.85)
MONOCYTES NFR BLD AUTO: 5.1 % (ref 0–13.4)
NEUTROPHILS # BLD AUTO: 7.42 K/UL (ref 2–7.15)
NEUTROPHILS NFR BLD: 78.9 % (ref 44–72)
NRBC # BLD AUTO: 0.02 K/UL
NRBC BLD-RTO: 0.2 /100 WBC
PLATELET # BLD AUTO: 350 K/UL (ref 164–446)
PMV BLD AUTO: 9.1 FL (ref 9–12.9)
POTASSIUM SERPL-SCNC: 3.9 MMOL/L (ref 3.6–5.5)
PROT SERPL-MCNC: 7.9 G/DL (ref 6–8.2)
RBC # BLD AUTO: 5.4 M/UL (ref 4.2–5.4)
SODIUM SERPL-SCNC: 137 MMOL/L (ref 135–145)
TROPONIN I SERPL-MCNC: <0.01 NG/ML (ref 0–0.04)
WBC # BLD AUTO: 9.4 K/UL (ref 4.8–10.8)

## 2018-02-16 PROCEDURE — 96374 THER/PROPH/DIAG INJ IV PUSH: CPT

## 2018-02-16 PROCEDURE — 85025 COMPLETE CBC W/AUTO DIFF WBC: CPT

## 2018-02-16 PROCEDURE — 82962 GLUCOSE BLOOD TEST: CPT

## 2018-02-16 PROCEDURE — A9270 NON-COVERED ITEM OR SERVICE: HCPCS | Performed by: EMERGENCY MEDICINE

## 2018-02-16 PROCEDURE — 83735 ASSAY OF MAGNESIUM: CPT

## 2018-02-16 PROCEDURE — 80053 COMPREHEN METABOLIC PANEL: CPT

## 2018-02-16 PROCEDURE — 70450 CT HEAD/BRAIN W/O DYE: CPT

## 2018-02-16 PROCEDURE — 700102 HCHG RX REV CODE 250 W/ 637 OVERRIDE(OP): Performed by: EMERGENCY MEDICINE

## 2018-02-16 PROCEDURE — 99284 EMERGENCY DEPT VISIT MOD MDM: CPT

## 2018-02-16 PROCEDURE — 84484 ASSAY OF TROPONIN QUANT: CPT

## 2018-02-16 PROCEDURE — 94760 N-INVAS EAR/PLS OXIMETRY 1: CPT

## 2018-02-16 PROCEDURE — 83880 ASSAY OF NATRIURETIC PEPTIDE: CPT

## 2018-02-16 PROCEDURE — 700111 HCHG RX REV CODE 636 W/ 250 OVERRIDE (IP): Performed by: EMERGENCY MEDICINE

## 2018-02-16 PROCEDURE — 93005 ELECTROCARDIOGRAM TRACING: CPT | Performed by: EMERGENCY MEDICINE

## 2018-02-16 PROCEDURE — 36415 COLL VENOUS BLD VENIPUNCTURE: CPT

## 2018-02-16 RX ORDER — ONDANSETRON 2 MG/ML
4 INJECTION INTRAMUSCULAR; INTRAVENOUS ONCE
Status: COMPLETED | OUTPATIENT
Start: 2018-02-16 | End: 2018-02-16

## 2018-02-16 RX ORDER — MECLIZINE HYDROCHLORIDE 25 MG/1
25 TABLET ORAL ONCE
Status: COMPLETED | OUTPATIENT
Start: 2018-02-16 | End: 2018-02-16

## 2018-02-16 RX ORDER — MECLIZINE HYDROCHLORIDE 25 MG/1
25 TABLET ORAL 3 TIMES DAILY PRN
Qty: 15 TAB | Refills: 0 | Status: SHIPPED | OUTPATIENT
Start: 2018-02-16

## 2018-02-16 RX ORDER — ONDANSETRON 4 MG/1
4 TABLET, ORALLY DISINTEGRATING ORAL EVERY 6 HOURS PRN
Qty: 10 TAB | Refills: 0 | Status: SHIPPED | OUTPATIENT
Start: 2018-02-16

## 2018-02-16 RX ADMIN — MECLIZINE HYDROCHLORIDE 25 MG: 25 TABLET ORAL at 04:09

## 2018-02-16 RX ADMIN — ONDANSETRON 4 MG: 2 INJECTION INTRAMUSCULAR; INTRAVENOUS at 03:23

## 2018-02-16 ASSESSMENT — LIFESTYLE VARIABLES: DO YOU DRINK ALCOHOL: NO

## 2018-02-16 ASSESSMENT — PAIN SCALES - GENERAL: PAINLEVEL_OUTOF10: 0

## 2018-02-16 NOTE — ED PROVIDER NOTES
ED Provider Note    CHIEF COMPLAINT  Chief Complaint   Patient presents with   • Dizziness     Pt's famly states that she woke up dizzy and with high blood pressure in the 200's systollically.. Pt. states hx of DM Type 2. Pt's fingerstick BS in triage is 211.   • Hypertension     Pt's family states hx of HTN. Pt. is on lisinopril and lipitor for blood pressure. Per family, pt's BP was in the 200's systollically tonight. Pt. is hypertensive in triage.   • N/V     Per pt's family pt. has been throwing up and having nausea since this incident.        HPI    Primary care provider: Pcp Pt States None   History obtained from: Patient and her daughter and son-in-law  History limited by: None     Analia Martel is a 72 y.o. female who presents to the ED complaining of sudden onset of dizziness described as spinning sensation that woke her up shortly after midnight. Symptoms are worse with head movement and improves with holding still. She was noted to have elevated blood pressure with systolic over 200 at home and patient's family gave her an extra dose of lisinopril. She has not had any recent changes to her medication and has not missed any medications. Family reports patient's blood pressure is usually 140 systolic. She denies any pain. She denies any fever. She has associated nausea with her dizziness. She did have episode of diarrhea today. She denies dysuria. She denies any visual or hearing changes. She denies tinnitus. She denies any weakness or sensory change. No prior history of similar dizziness.      REVIEW OF SYSTEMS  Please see HPI for pertinent positives/negatives.  All other systems reviewed and are negative.     PAST MEDICAL HISTORY  Past Medical History:   Diagnosis Date   • Diabetes (CMS-HCC)         SURGICAL HISTORY  Past Surgical History:   Procedure Laterality Date   • HIP NAILING INTRAMEDULLARY  3/4/2017    Procedure: HIP NAILING INTRAMEDULLARY;  Surgeon: Jean Carlos Ozuna M.D.;  Location:  "SURGERY [facility];  Service:    • GYN SURGERY      removal of ectopic pregnancy many yrs ago        SOCIAL HISTORY  Social History     Social History Main Topics   • Smoking status: Current Every Day Smoker     Packs/day: 0.50   • Smokeless tobacco: Not on file   • Alcohol use No   • Drug use: No   • Sexual activity: Not on file        FAMILY HISTORY  No family history on file.     CURRENT MEDICATIONS  Home Medications     Reviewed by Anthony Starks R.N. (Registered Nurse) on 02/16/18 at 0353  Med List Status: Complete   Medication Last Dose Status   ferrous gluconate (FERGON) 324 (38 FE) MG Tab  Active   lisinopril (PRINIVIL) 5 MG Tab  Active   metformin (GLUCOPHAGE) 500 MG Tab 3/4/2017 Active   Multiple Vitamin (MULTI VITAMIN DAILY PO) 3/4/2017 Active   oxybutynin (DITROPAN) 5 MG Tab  Active   oxycodone immediate-release (ROXICODONE) 5 MG Tab  Active                 ALLERGIES  No Known Allergies     PHYSICAL EXAM  VITAL SIGNS: BP (!) 198/69   Pulse 69   Temp (!) 35.7 °C (96.3 °F)   Resp 16   Ht 1.448 m (4' 9\")   SpO2 99%  @[id number]::@     Pulse ox interpretation: 97% I interpret this pulse ox as normal     Constitutional: Thin pt, alert in no apparent distress, nontoxic appearance    HENT: No external signs of trauma, normocephalic, bilateral external ears normal, TMs clear bilaterally, oropharynx moist and clear, nose normal    Eyes: PERRL, EOMI, vision and visual fields grossly intact, conjunctiva without erythema, no discharge, no icterus    Neck: Soft and supple, trachea midline, no stridor, no tenderness, no LAD, no JVD, good ROM    Cardiovascular: Regular rate and rhythm, no murmurs/rubs/gallops, strong distal pulses and good perfusion    Thorax & Lungs: No respiratory distress, CTAB  Abdomen: Soft, nontender, nondistended, no guarding, no rebound, normal BS    Back: No CVAT    Extremities: No clubbing, no cyanosis, no edema, no gross deformity, good ROM, no tenderness, intact distal pulses " with brisk cap refill    Skin: Warm, dry, no pallor/cyanosis, no rash noted    Lymphatic: No lymphadenopathy noted    Neuro: Alert and oriented to person, place, and time.  GCS 15.  CN II-XII grossly intact.  Normal speech.  No pronator drift.  Equal strength bilateral UE/LE.  Sensation intact to touch.  Normal finger to nose, rapid alternating hand movement and heel-to-shin bilaterally.  Psychiatric: Cooperative, normal judgement, appropriate for clinical situation          DIAGNOSTIC STUDIES / PROCEDURES    EKG  12 Lead EKG obtained at 0403 and interpreted by me:   Rate: 60   Rhythm: Sinus rhythm   Ectopy: None  Intervals: QTc 492  Axis: Normal   Q Waves: None   QRS: Normal   ST segments: Normal  T Waves: Diffuse T-wave inversion    Clinical Impression: Sinus rhythm with borderline QT prolongation and diffuse T-wave inversion  Compared to March 4, 2017 without significant change      LABS  All labs reviewed by me.     Results for orders placed or performed during the hospital encounter of 02/16/18   CBC WITH DIFFERENTIAL   Result Value Ref Range    WBC 9.4 4.8 - 10.8 K/uL    RBC 5.40 4.20 - 5.40 M/uL    Hemoglobin 11.1 (L) 12.0 - 16.0 g/dL    Hematocrit 36.3 (L) 37.0 - 47.0 %    MCV 67.2 (L) 81.4 - 97.8 fL    MCH 20.6 (L) 27.0 - 33.0 pg    MCHC 30.6 (L) 33.6 - 35.0 g/dL    RDW 34.5 (L) 35.9 - 50.0 fL    Platelet Count 350 164 - 446 K/uL    MPV 9.1 9.0 - 12.9 fL    Neutrophils-Polys 78.90 (H) 44.00 - 72.00 %    Lymphocytes 11.70 (L) 22.00 - 41.00 %    Monocytes 5.10 0.00 - 13.40 %    Eosinophils 1.90 0.00 - 6.90 %    Basophils 1.30 0.00 - 1.80 %    Immature Granulocytes 1.10 (H) 0.00 - 0.90 %    Nucleated RBC 0.20 /100 WBC    Neutrophils (Absolute) 7.42 (H) 2.00 - 7.15 K/uL    Lymphs (Absolute) 1.10 1.00 - 4.80 K/uL    Monos (Absolute) 0.48 0.00 - 0.85 K/uL    Eos (Absolute) 0.18 0.00 - 0.51 K/uL    Baso (Absolute) 0.12 0.00 - 0.12 K/uL    Immature Granulocytes (abs) 0.10 0.00 - 0.11 K/uL    NRBC (Absolute) 0.02  K/uL   COMP METABOLIC PANEL   Result Value Ref Range    Sodium 137 135 - 145 mmol/L    Potassium 3.9 3.6 - 5.5 mmol/L    Chloride 103 96 - 112 mmol/L    Co2 25 20 - 33 mmol/L    Anion Gap 9.0 0.0 - 11.9    Glucose 204 (H) 65 - 99 mg/dL    Bun 15 8 - 22 mg/dL    Creatinine 0.70 0.50 - 1.40 mg/dL    Calcium 9.3 8.5 - 10.5 mg/dL    AST(SGOT) 26 12 - 45 U/L    ALT(SGPT) 11 2 - 50 U/L    Alkaline Phosphatase 48 30 - 99 U/L    Total Bilirubin 0.5 0.1 - 1.5 mg/dL    Albumin 4.3 3.2 - 4.9 g/dL    Total Protein 7.9 6.0 - 8.2 g/dL    Globulin 3.6 (H) 1.9 - 3.5 g/dL    A-G Ratio 1.2 g/dL   TROPONIN   Result Value Ref Range    Troponin I <0.01 0.00 - 0.04 ng/mL   BTYPE NATRIURETIC PEPTIDE   Result Value Ref Range    B Natriuretic Peptide 24 0 - 100 pg/mL   MAGNESIUM   Result Value Ref Range    Magnesium 2.0 1.5 - 2.5 mg/dL   ESTIMATED GFR   Result Value Ref Range    GFR If African American >60 >60 mL/min/1.73 m 2    GFR If Non African American >60 >60 mL/min/1.73 m 2   ACCU-CHEK GLUCOSE   Result Value Ref Range    Glucose - Accu-Ck 211 (H) 65 - 99 mg/dL   EKG (NOW)   Result Value Ref Range    Report       [facility] Emergency Dept.    Test Date:  2018  Pt Name:    GEOFF HAYNES              Department: ER  MRN:        [id number]                      Room:       RD 03  Gender:     Female                       Technician: [id number]  :        [id number]                   Requested By:BRODERICK LAND  Order #:    [id number]                    Gautam MD:    Measurements  Intervals                                Axis  Rate:       60                           P:          42  WA:         180                          QRS:        57  QRSD:       74                           T:          145  QT:         492  QTc:        492    Interpretive Statements  SINUS RHYTHM  ABNORMAL T, PROBABLE ISCHEMIA, ANT-LAT LEADS  BORDERLINE PROLONGED QT INTERVAL  Compared to ECG 2017 10:31:45  No significant changes           RADIOLOGY  The radiologist's interpretation of all radiological studies have been reviewed by me.     CT-HEAD W/O   Final Result      1.  Diffuse atrophy and white matter changes.   2.  No acute intracranial hemorrhage or territorial infarct.   3.  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             COURSE & MEDICAL DECISION MAKING  Nursing notes, VS, PMSFHx reviewed in chart.       Differential diagnoses considered include but are not limited to: Vertigo, labyrinthitis, Ménière's disease, vestibular neuronitis, CVA/TIA, tumor, dysrhythmia, bleeding/anemia, dehydration, syncope/near syncope, ataxia       Family brings patient to the ED with above complaint. EKG without significant change compared to March 4, 2017. CT head with chronic appearing findings. Labs were essentially unremarkable except for mild anemia which appears chronic compared to previous and mild hyperglycemia due to her diabetes. Patient was given Zofran and Antivert. Findings discussed with patient and her family. They report that she is doing much better. She tolerated oral fluids without further vomiting. Patient reports that her nausea and dizziness have both improved. Her initial hypertension has improved as well. Patient now noted to be resting comfortably in no acute distress and nontoxic in appearance. There are no focal or worrisome neurological findings to suggest an acute serious intracranial process. There is no evidence of hypertensive emergency. Her vertigo appears to be positional in nature and I suspect her hypertension may be due to the discomfort she was feeling. Patient will be given referral to ENT. They were advised on outpatient follow-up and given return to ED precautions. Patient will be discharged home with prescription of Zofran and Antivert. They verbalized understanding and agreed with plan of care with no further questions or concerns.      The patient is referred to a primary  physician for blood pressure management, diabetic screening, and for all other preventative health concerns.       FINAL IMPRESSION  1. Vertigo           DISPOSITION  Patient will be discharged home in stable condition.       FOLLOW UP  Jose Alberto Bai M.D.  [address] [facility] [address]  [phone]    Call today      [facility], Emergency Dept  [address] [facility] [address]  [place] [id number] [phone]    If symptoms worsen         OUTPATIENT MEDICATIONS  Discharge Medication List as of 2/16/2018  5:44 AM      START taking these medications    Details   ondansetron (ZOFRAN ODT) 4 MG TABLET DISPERSIBLE Take 1 Tab by mouth every 6 hours as needed., Disp-10 Tab, R-0, Print Rx Paper      meclizine (ANTIVERT) 25 MG Tab Take 1 Tab by mouth 3 times a day as needed., Disp-15 Tab, R-0, Print Rx Paper                Electronically signed by: Joshua Silver, 2/16/2018 2:41 AM      Portions of this record were made with voice recognition software.  Despite my review, spelling/grammar/context errors may still remain.  Interpretation of this chart should be taken in this context.

## 2018-02-16 NOTE — ED NOTES
Pt discharged home with prescriptions and paperwork in hand.  Pt verbalizes understanding of dc instructions.  Pt is a/ox4, amb with a steady gait, all belongings accounted for

## 2018-02-16 NOTE — ED TRIAGE NOTES
"Analia Martel  72 y.o. female  Chief Complaint   Patient presents with   • Dizziness     Pt's famly states that she woke up dizzy and with high blood pressure in the 200's systollically.. Pt. states hx of DM Type 2. Pt's fingerstick BS in triage is 211.   • Hypertension     Pt's family states hx of HTN. Pt. is on lisinopril and lipitor for blood pressure. Per family, pt's BP was in the 200's systollically tonight. Pt. is hypertensive in triage.   • N/V     Per pt's family pt. has been throwing up and having nausea since this incident.     BP (!) 198/69   Pulse 60   Temp (!) 35.7 °C (96.3 °F)   Resp 18   Ht 1.448 m (4' 9\")   SpO2 97%     Pt. Is sepsis score of 3. Charge RN notified.  Pt amb to triage with steady gait for above complaint.   Pt's family has been translating for her. Pt's family states she has not been acting herself. Pt. Is PERRLA. Pt. Is A&O x 4 with family translation. Pt. Is able to move all four extremities appropriately. Pt's airway is intact. No s/s of difficulty breathing.  Pt placed in lobby. Pt educated on triage process. Pt encouraged to alert staff for any changes.  "

## 2018-02-16 NOTE — ED NOTES
Assumed care of patient.  Pt placed on cardiac monitor, changed into gown, IV established.  Blood sent to lab

## 2018-02-16 NOTE — DISCHARGE INSTRUCTIONS
Dizziness  Dizziness is a common problem. It is a feeling of unsteadiness or light-headedness. You may feel like you are about to faint. Dizziness can lead to injury if you stumble or fall. Anyone can become dizzy, but dizziness is more common in older adults. This condition can be caused by a number of things, including medicines, dehydration, or illness.  HOME CARE INSTRUCTIONS  Taking these steps may help with your condition:  Eating and Drinking  · Drink enough fluid to keep your urine clear or pale yellow. This helps to keep you from becoming dehydrated. Try to drink more clear fluids, such as water.  · Do not drink alcohol.  · Limit your caffeine intake if directed by your health care provider.  · Limit your salt intake if directed by your health care provider.  Activity  · Avoid making quick movements.  ¨ Rise slowly from chairs and steady yourself until you feel okay.  ¨ In the morning, first sit up on the side of the bed. When you feel okay, stand slowly while you hold onto something until you know that your balance is fine.  · Move your legs often if you need to  one place for a long time. Tighten and relax your muscles in your legs while you are standing.  · Do not drive or operate heavy machinery if you feel dizzy.  · Avoid bending down if you feel dizzy. Place items in your home so that they are easy for you to reach without leaning over.  Lifestyle  · Do not use any tobacco products, including cigarettes, chewing tobacco, or electronic cigarettes. If you need help quitting, ask your health care provider.  · Try to reduce your stress level, such as with yoga or meditation. Talk with your health care provider if you need help.  General Instructions  · Watch your dizziness for any changes.  · Take medicines only as directed by your health care provider. Talk with your health care provider if you think that your dizziness is caused by a medicine that you are taking.  · Tell a friend or a family  member that you are feeling dizzy. If he or she notices any changes in your behavior, have this person call your health care provider.  · Keep all follow-up visits as directed by your health care provider. This is important.  SEEK MEDICAL CARE IF:  · Your dizziness does not go away.  · Your dizziness or light-headedness gets worse.  · You feel nauseous.  · You have reduced hearing.  · You have new symptoms.  · You are unsteady on your feet or you feel like the room is spinning.  SEEK IMMEDIATE MEDICAL CARE IF:  · You vomit or have diarrhea and are unable to eat or drink anything.  · You have problems talking, walking, swallowing, or using your arms, hands, or legs.  · You feel generally weak.  · You are not thinking clearly or you have trouble forming sentences. It may take a friend or family member to notice this.  · You have chest pain, abdominal pain, shortness of breath, or sweating.  · Your vision changes.  · You notice any bleeding.  · You have a headache.  · You have neck pain or a stiff neck.  · You have a fever.     This information is not intended to replace advice given to you by your health care provider. Make sure you discuss any questions you have with your health care provider.     Document Released: 06/13/2002 Document Revised: 05/03/2016 Document Reviewed: 12/14/2015  [employer] Interactive Patient Education ©2016 [employer]      Benign Positional Vertigo  Vertigo means you feel like you or your surroundings are moving when they are not. Benign positional vertigo is the most common form of vertigo. Benign means that the cause of your condition is not serious. Benign positional vertigo is more common in older adults.  CAUSES   Benign positional vertigo is the result of an upset in the labyrinth system. This is an area in the middle ear that helps control your balance. This may be caused by a viral infection, head injury, or repetitive motion. However, often no specific cause is found.  SYMPTOMS    Symptoms of benign positional vertigo occur when you move your head or eyes in different directions. Some of the symptoms may include:  · Loss of balance and falls.  · Vomiting.  · Blurred vision.  · Dizziness.  · Nausea.  · Involuntary eye movements (nystagmus).  DIAGNOSIS   Benign positional vertigo is usually diagnosed by physical exam. If the specific cause of your benign positional vertigo is unknown, your caregiver may perform imaging tests, such as magnetic resonance imaging (MRI) or computed tomography (CT).  TREATMENT   Your caregiver may recommend movements or procedures to correct the benign positional vertigo. Medicines such as meclizine, benzodiazepines, and medicines for nausea may be used to treat your symptoms. In rare cases, if your symptoms are caused by certain conditions that affect the inner ear, you may need surgery.  HOME CARE INSTRUCTIONS   · Follow your caregiver's instructions.  · Move slowly. Do not make sudden body or head movements.  · Avoid driving.  · Avoid operating heavy machinery.  · Avoid performing any tasks that would be dangerous to you or others during a vertigo episode.  · Drink enough fluids to keep your urine clear or pale yellow.  SEEK IMMEDIATE MEDICAL CARE IF:   · You develop problems with walking, weakness, numbness, or using your arms, hands, or legs.  · You have difficulty speaking.  · You develop severe headaches.  · Your nausea or vomiting continues or gets worse.  · You develop visual changes.  · Your family or friends notice any behavioral changes.  · Your condition gets worse.  · You have a fever.  · You develop a stiff neck or sensitivity to light.  MAKE SURE YOU:   · Understand these instructions.  · Will watch your condition.  · Will get help right away if you are not doing well or get worse.     This information is not intended to replace advice given to you by your health care provider. Make sure you discuss any questions you have with your health care  provider.     Document Released: 09/25/2007 Document Revised: 03/11/2013 Document Reviewed: 04/11/2016  [employer] Interactive Patient Education ©2016 [employer]    Epley Maneuver Self-Care  WHAT IS THE EPLEY MANEUVER?  The Epley maneuver is an exercise you can do to relieve symptoms of benign paroxysmal positional vertigo (BPPV). This condition is often just referred to as vertigo. BPPV is caused by the movement of tiny crystals (canaliths) inside your inner ear. The accumulation and movement of canaliths in your inner ear causes a sudden spinning sensation (vertigo) when you move your head to certain positions. Vertigo usually lasts about 30 seconds. BPPV usually occurs in just one ear. If you get vertigo when you lie on your left side, you probably have BPPV in your left ear. Your health care provider can tell you which ear is involved.   BPPV may be caused by a head injury. Many people older than 50 get BPPV for unknown reasons. If you have been diagnosed with BPPV, your health care provider may teach you how to do this maneuver. BPPV is not life threatening (benign) and usually goes away in time.   WHEN SHOULD I PERFORM THE EPLEY MANEUVER?  You can do this maneuver at home whenever you have symptoms of vertigo. You may do the Epley maneuver up to 3 times a day until your symptoms of vertigo go away.  HOW SHOULD I DO THE EPLEY MANEUVER?  1. Sit on the edge of a bed or table with your back straight. Your legs should be extended or hanging over the edge of the bed or table.    2. Turn your head shelter toward the affected ear.    3. Lie backward quickly with your head turned until you are lying flat on your back. You may want to position a pillow under your shoulders.    4. Hold this position for 30 seconds. You may experience an attack of vertigo. This is normal. Hold this position until the vertigo stops.  5. Then turn your head to the opposite direction until your unaffected ear is facing the floor.    6. Hold  this position for 30 seconds. You may experience an attack of vertigo. This is normal. Hold this position until the vertigo stops.  7. Now turn your whole body to the same side as your head. Hold for another 30 seconds.    8. You can then sit back up.  ARE THERE RISKS TO THIS MANEUVER?  In some cases, you may have other symptoms (such as changes in your vision, weakness, or numbness). If you have these symptoms, stop doing the maneuver and call your health care provider. Even if doing these maneuvers relieves your vertigo, you may still have dizziness. Dizziness is the sensation of light-headedness but without the sensation of movement. Even though the Epley maneuver may relieve your vertigo, it is possible that your symptoms will return within 5 years.  WHAT SHOULD I DO AFTER THIS MANEUVER?  After doing the Epley maneuver, you can return to your normal activities. Ask your doctor if there is anything you should do at home to prevent vertigo. This may include:  · Sleeping with two or more pillows to keep your head elevated.  · Not sleeping on the side of your affected ear.  · Getting up slowly from bed.  · Avoiding sudden movements during the day.  · Avoiding extreme head movement, like looking up or bending over.  · Wearing a cervical collar to prevent sudden head movements.  WHAT SHOULD I DO IF MY SYMPTOMS GET WORSE?  Call your health care provider if your vertigo gets worse. Call your provider right way if you have other symptoms, including:   · Nausea.  · Vomiting.  · Headache.  · Weakness.  · Numbness.  · Vision changes.     This information is not intended to replace advice given to you by your health care provider. Make sure you discuss any questions you have with your health care provider.     Document Released: 12/23/2014 Document Reviewed: 12/23/2014  [employer] Interactive Patient Education ©2016 [employer]

## 2021-01-14 DIAGNOSIS — Z23 NEED FOR VACCINATION: ICD-10-CM

## 2023-08-16 NOTE — THERAPY
"Occupational Therapy Evaluation completed.   Functional Status:  Mod A supine to sit.  Max A LB dressing.  Min A sit to stand, mod A to pivot to bedside chair.  Pt's daughter assisted pt to brush hair and wash face once seated in chair.  Plan of Care: Will benefit from Occupational Therapy 3 times per week  Discharge Recommendations:  Equipment: Will Continue to Assess for Equipment Needs. Post-acute therapy recommended before discharged home.    See \"Rehab Therapy-Acute\" Patient Summary Report for complete documentation.    "
"Occupational Therapy Treatment completed with focus on ADLs, ADL transfers and caregiver training.  Functional Status:  Pt able to dress LB with CGA & UB with set up.  Daughter has been assisting with toilet transfers.  Recommended daughter get a BSC for home use due to pt's increased frequency & urgency.  Daughter will be able to provide 24/7 assist as needed.  Plan of Care: Will benefit from Occupational Therapy 3 times per week  Discharge Recommendations:  Equipment Front-Wheel Walker, Shower Chair and Bedside Commode. Post-acute therapy recommended after discharged home.    See \"Rehab Therapy-Acute\" Patient Summary Report for complete documentation.   "
"Patient demonstrates decreased functional mobility due to pain, post op status and c/o dizziness with mobility; c/o dizziness with transition to sitting edge of bed - dizziness did not improve with rest, deep breathing , monitored vitals as above no OH noted, Pts DTR present stating\" Moms been dizzy for a long time\", Pt needed CGA for all activitysand was [facility] with DTR trained on transfer safety with Pt nodding agreement. Pt will benifit from acute/post acute rehab.Physical Therapy Treatment completed.   Bed Mobility:  Supine to Sit: Contact Guard Assist  Transfers: Sit to Stand: Contact Guard Assist  Gait: Level Of Assist: Contact Guard Assist with Front-Wheel Walker       Plan of Care: Will benefit from Physical Therapy 3 times per week  Discharge Recommendations: Equipment: Front-Wheel Walker. Post-acute therapy recommended before discharged home.     See \"Rehab Therapy-Acute\" Patient Summary Report for complete documentation.       "
"Physical Therapy Evaluation completed.   Bed Mobility:  Supine to Sit: Maximal Assist  Transfers: Sit to Stand: Refused  Gait: Level Of Assist: Unable to Participate       Plan of Care: Will benefit from Physical Therapy 3 times per week  Discharge Recommendations: Equipment: Will Continue to Assess for Equipment Needs. Post-acute therapy recommended before discharged home.    See \"Rehab Therapy-Acute\" Patient Summary Report for complete documentation.     "
"Physical Therapy Treatment completed.   Bed Mobility:  Supine to Sit: Contact Guard Assist  Transfers: Sit to Stand: Contact Guard Assist  Gait: Level Of Assist: Contact Guard Assist with Front-Wheel Walker       Plan of Care: Will benefit from Physical Therapy 4 times per week  Discharge Recommendations: Equipment: Front-Wheel Walker (maggy). Post-acute therapy recommended before discharged home.     See \"Rehab Therapy-Acute\" Patient Summary Report for complete documentation.       "
"Pts function has improved greatly, DTR is providing 24/7 care and Pt MOD I for bed mob, trasnfers gait with FWW on flat level surfaces, slow but steady gait speed, PT/DTR demoed good awareness during stair training x 5 steps up/down using handrails and SBA. Dtr appears capeable of understanding Pts burden of care nees and expressed that she was a Physical Therapist in the [facility] before comming here. Pt will need info from [facility] for any DME needs Pt will benifit from Home Health after D/C home once cleared by MD.Physical Therapy Treatment completed.   Bed Mobility:  Supine to Sit: Modified Independent  Transfers: Sit to Stand: Modified Independent  Gait: Level Of Assist: Stand by Assist with Front-Wheel Walker       Plan of Care: Will benefit from Physical Therapy 4 times per week  Discharge Recommendations: Equipment: Front-Wheel Walker. Post-acute therapy recommended after discharged home.     See \"Rehab Therapy-Acute\" Patient Summary Report for complete documentation.       "
show

## (undated) DEVICE — SET LEADWIRE 5 LEAD BEDSIDE DISPOSABLE ECG (1SET OF 5/EA)

## (undated) DEVICE — PROTECTOR ULNA NERVE - (36PR/CA)

## (undated) DEVICE — ELECTRODE 850 FOAM ADHESIVE - HYDROGEL RADIOTRNSPRNT (50/PK)

## (undated) DEVICE — SLEEVE, VASO, THIGH, MED

## (undated) DEVICE — GUIDE WIRE

## (undated) DEVICE — MASK ANESTHESIA ADULT  - (100/CA)

## (undated) DEVICE — CANISTER SUCTION 3000ML MECHANICAL FILTER AUTO SHUTOFF MEDI-VAC NONSTERILE LF DISP  (40EA/CA)

## (undated) DEVICE — HEAD HOLDER JUNIOR/ADULT

## (undated) DEVICE — SENSOR SPO2 NEO LNCS ADHESIVE (20/BX) SEE USER NOTES

## (undated) DEVICE — GLOVE BIOGEL PI ORTHO SZ 7.5 PF LF (40PR/BX)

## (undated) DEVICE — KIT ROOM DECONTAMINATION

## (undated) DEVICE — KIT ANESTHESIA W/CIRCUIT & 3/LT BAG W/FILTER (20EA/CA)

## (undated) DEVICE — NEPTUNE 4 PORT MANIFOLD - (20/PK)

## (undated) DEVICE — GLOVE BIOGEL PI INDICATOR SZ 8.0 SURGICAL PF LF -(50/BX 4BX/CA)

## (undated) DEVICE — DRILL BIT

## (undated) DEVICE — DRAPE C-ARM LARGE 41IN X 74 IN - (10/BX 2BX/CA)

## (undated) DEVICE — BOVIE BLADE COATED - (50/PK)

## (undated) DEVICE — ELECTRODE DUAL RETURN W/ CORD - (50/PK)

## (undated) DEVICE — DRAPE U ORTHOPEDIC - (10/BX)

## (undated) DEVICE — DRAPE LARGE 3 QUARTER - (20/CA)

## (undated) DEVICE — DRESSING LEUKOMED STERILE 11.75X4IN - (50/CA)

## (undated) DEVICE — DRAPE IOBAN II INCISE 23X17 - (10EA/BX 4BX/CA)

## (undated) DEVICE — SUCTION INSTRUMENT YANKAUER BULBOUS TIP W/O VENT (50EA/CA)

## (undated) DEVICE — SUTURE 0 VICRYL PLUS CT-1 - 36 INCH (36/BX)

## (undated) DEVICE — GLOVE BIOGEL INDICATOR SZ 7.5 SURGICAL PF LTX - (50PR/BX 4BX/CA)

## (undated) DEVICE — DRAPE SURGICAL U 77X120 - (10/CA)

## (undated) DEVICE — SET EXTENSION WITH 2 PORTS (48EA/CA) ***PART #2C8610 IS A SUBSTITUTE*****

## (undated) DEVICE — LACTATED RINGERS INJ 1000 ML - (14EA/CA 60CA/PF)

## (undated) DEVICE — SUTURE 2-0 VICRYL PLUS CT-1 36 (36PK/BX)"

## (undated) DEVICE — TUBING CLEARLINK DUO-VENT - C-FLO (48EA/CA)

## (undated) DEVICE — DRAPE STRLE REG TOWEL 18X24 - (10/BX 4BX/CA)"

## (undated) DEVICE — PACK MAJOR ORTHO - (2EA/CA)

## (undated) DEVICE — GLOVE BIOGEL ECLIPSE PF LATEX SIZE 7.5

## (undated) DEVICE — REAMING ROD

## (undated) DEVICE — SUTURE GENERAL

## (undated) DEVICE — DRAPE SURG STERI-DRAPE 7X11OD - (40EA/CA)

## (undated) DEVICE — GOWN WARMING STANDARD FLEX - (30/CA)

## (undated) DEVICE — CHLORAPREP 26 ML APPLICATOR - ORANGE TINT(25/CA)

## (undated) DEVICE — LEAD SET 6 DISP. EKG NIHON KOHDEN (100EA/CA) [9859].

## (undated) DEVICE — MASK AIRWAY SIZE 3 UNIQUE SILICON (10/BX)